# Patient Record
Sex: MALE | Race: WHITE | NOT HISPANIC OR LATINO | Employment: FULL TIME | URBAN - METROPOLITAN AREA
[De-identification: names, ages, dates, MRNs, and addresses within clinical notes are randomized per-mention and may not be internally consistent; named-entity substitution may affect disease eponyms.]

---

## 2018-02-09 ENCOUNTER — HOSPITAL ENCOUNTER (EMERGENCY)
Facility: OTHER | Age: 33
Discharge: HOME | End: 2018-02-09
Attending: EMERGENCY MEDICINE
Payer: COMMERCIAL

## 2018-02-09 VITALS
RESPIRATION RATE: 18 BRPM | TEMPERATURE: 98 F | OXYGEN SATURATION: 100 % | HEART RATE: 60 BPM | HEIGHT: 73 IN | WEIGHT: 185 LBS | DIASTOLIC BLOOD PRESSURE: 58 MMHG | BODY MASS INDEX: 24.52 KG/M2 | SYSTOLIC BLOOD PRESSURE: 102 MMHG

## 2018-02-09 DIAGNOSIS — R10.12 LEFT UPPER QUADRANT PAIN: ICD-10-CM

## 2018-02-09 DIAGNOSIS — J06.9 VIRAL URI WITH COUGH: Primary | ICD-10-CM

## 2018-02-09 LAB
ALBUMIN SERPL BCP-MCNC: 4.2 G/DL
ALP SERPL-CCNC: 54 U/L
ALT SERPL W/O P-5'-P-CCNC: 25 U/L
AMORPH CRY URNS QL MICRO: NORMAL
ANION GAP SERPL CALC-SCNC: 10 MMOL/L
AST SERPL-CCNC: 22 U/L
BACTERIA #/AREA URNS HPF: NORMAL /HPF
BASOPHILS # BLD AUTO: 0.01 K/UL
BASOPHILS NFR BLD: 0.1 %
BILIRUB SERPL-MCNC: 1.2 MG/DL
BILIRUB UR QL STRIP: NEGATIVE
BUN SERPL-MCNC: 16 MG/DL
CALCIUM SERPL-MCNC: 9.6 MG/DL
CHLORIDE SERPL-SCNC: 103 MMOL/L
CLARITY UR: CLEAR
CO2 SERPL-SCNC: 27 MMOL/L
COLOR UR: YELLOW
CREAT SERPL-MCNC: 1 MG/DL
DIFFERENTIAL METHOD: ABNORMAL
EOSINOPHIL # BLD AUTO: 0 K/UL
EOSINOPHIL NFR BLD: 0 %
ERYTHROCYTE [DISTWIDTH] IN BLOOD BY AUTOMATED COUNT: 12.6 %
EST. GFR  (AFRICAN AMERICAN): >60 ML/MIN/1.73 M^2
EST. GFR  (NON AFRICAN AMERICAN): >60 ML/MIN/1.73 M^2
GLUCOSE SERPL-MCNC: 113 MG/DL
GLUCOSE UR QL STRIP: NEGATIVE
HCT VFR BLD AUTO: 45.6 %
HETEROPH AB SERPL QL IA: NEGATIVE
HGB BLD-MCNC: 16.6 G/DL
HGB UR QL STRIP: NEGATIVE
KETONES UR QL STRIP: ABNORMAL
LEUKOCYTE ESTERASE UR QL STRIP: ABNORMAL
LIPASE SERPL-CCNC: 42 U/L
LYMPHOCYTES # BLD AUTO: 1.7 K/UL
LYMPHOCYTES NFR BLD: 12.5 %
MCH RBC QN AUTO: 31.2 PG
MCHC RBC AUTO-ENTMCNC: 36.4 G/DL
MCV RBC AUTO: 86 FL
MICROSCOPIC COMMENT: NORMAL
MONOCYTES # BLD AUTO: 1.1 K/UL
MONOCYTES NFR BLD: 8.3 %
NEUTROPHILS # BLD AUTO: 10.6 K/UL
NEUTROPHILS NFR BLD: 78.9 %
NITRITE UR QL STRIP: NEGATIVE
PH UR STRIP: 8 [PH] (ref 5–8)
PLATELET # BLD AUTO: 168 K/UL
PMV BLD AUTO: 10.5 FL
POTASSIUM SERPL-SCNC: 3.9 MMOL/L
PROT SERPL-MCNC: 7.3 G/DL
PROT UR QL STRIP: NEGATIVE
RBC # BLD AUTO: 5.32 M/UL
RBC #/AREA URNS HPF: 2 /HPF (ref 0–4)
SODIUM SERPL-SCNC: 140 MMOL/L
SP GR UR STRIP: 1.01 (ref 1–1.03)
SQUAMOUS #/AREA URNS HPF: 2 /HPF
URN SPEC COLLECT METH UR: ABNORMAL
UROBILINOGEN UR STRIP-ACNC: ABNORMAL EU/DL
WBC # BLD AUTO: 13.44 K/UL
WBC #/AREA URNS HPF: 4 /HPF (ref 0–5)
WBC CLUMPS URNS QL MICRO: NORMAL
YEAST URNS QL MICRO: NORMAL

## 2018-02-09 PROCEDURE — 25000003 PHARM REV CODE 250: Performed by: EMERGENCY MEDICINE

## 2018-02-09 PROCEDURE — 85025 COMPLETE CBC W/AUTO DIFF WBC: CPT

## 2018-02-09 PROCEDURE — 83690 ASSAY OF LIPASE: CPT

## 2018-02-09 PROCEDURE — 96361 HYDRATE IV INFUSION ADD-ON: CPT

## 2018-02-09 PROCEDURE — 80053 COMPREHEN METABOLIC PANEL: CPT

## 2018-02-09 PROCEDURE — 96374 THER/PROPH/DIAG INJ IV PUSH: CPT

## 2018-02-09 PROCEDURE — 36415 COLL VENOUS BLD VENIPUNCTURE: CPT

## 2018-02-09 PROCEDURE — 63600175 PHARM REV CODE 636 W HCPCS: Performed by: EMERGENCY MEDICINE

## 2018-02-09 PROCEDURE — 81000 URINALYSIS NONAUTO W/SCOPE: CPT

## 2018-02-09 PROCEDURE — 86308 HETEROPHILE ANTIBODY SCREEN: CPT

## 2018-02-09 PROCEDURE — S0028 INJECTION, FAMOTIDINE, 20 MG: HCPCS | Performed by: EMERGENCY MEDICINE

## 2018-02-09 PROCEDURE — 96375 TX/PRO/DX INJ NEW DRUG ADDON: CPT

## 2018-02-09 PROCEDURE — 99284 EMERGENCY DEPT VISIT MOD MDM: CPT | Mod: 25

## 2018-02-09 RX ORDER — FAMOTIDINE 20 MG/1
20 TABLET, FILM COATED ORAL 2 TIMES DAILY
Qty: 20 TABLET | Refills: 0 | Status: SHIPPED | OUTPATIENT
Start: 2018-02-09 | End: 2023-06-08

## 2018-02-09 RX ORDER — AZITHROMYCIN 250 MG/1
500 TABLET, FILM COATED ORAL DAILY
COMMUNITY
End: 2023-06-08

## 2018-02-09 RX ORDER — KETOROLAC TROMETHAMINE 30 MG/ML
15 INJECTION, SOLUTION INTRAMUSCULAR; INTRAVENOUS
Status: COMPLETED | OUTPATIENT
Start: 2018-02-09 | End: 2018-02-09

## 2018-02-09 RX ORDER — ONDANSETRON 2 MG/ML
4 INJECTION INTRAMUSCULAR; INTRAVENOUS
Status: COMPLETED | OUTPATIENT
Start: 2018-02-09 | End: 2018-02-09

## 2018-02-09 RX ORDER — ONDANSETRON 8 MG/1
8 TABLET, ORALLY DISINTEGRATING ORAL
COMMUNITY
End: 2023-06-08

## 2018-02-09 RX ORDER — FAMOTIDINE 10 MG/ML
20 INJECTION INTRAVENOUS
Status: COMPLETED | OUTPATIENT
Start: 2018-02-09 | End: 2018-02-09

## 2018-02-09 RX ORDER — OSELTAMIVIR PHOSPHATE 30 MG/1
30 CAPSULE ORAL 2 TIMES DAILY
COMMUNITY
End: 2023-06-08

## 2018-02-09 RX ORDER — HYDROMORPHONE HYDROCHLORIDE 1 MG/ML
0.5 INJECTION, SOLUTION INTRAMUSCULAR; INTRAVENOUS; SUBCUTANEOUS
Status: COMPLETED | OUTPATIENT
Start: 2018-02-09 | End: 2018-02-09

## 2018-02-09 RX ADMIN — SODIUM CHLORIDE 1000 ML: 0.9 INJECTION, SOLUTION INTRAVENOUS at 02:02

## 2018-02-09 RX ADMIN — LIDOCAINE HYDROCHLORIDE: 20 SOLUTION ORAL; TOPICAL at 04:02

## 2018-02-09 RX ADMIN — ONDANSETRON 4 MG: 2 INJECTION INTRAMUSCULAR; INTRAVENOUS at 02:02

## 2018-02-09 RX ADMIN — FAMOTIDINE 20 MG: 10 INJECTION, SOLUTION INTRAVENOUS at 04:02

## 2018-02-09 RX ADMIN — KETOROLAC TROMETHAMINE 15 MG: 30 INJECTION, SOLUTION INTRAMUSCULAR at 02:02

## 2018-02-09 RX ADMIN — HYDROMORPHONE HYDROCHLORIDE 0.5 MG: 1 INJECTION, SOLUTION INTRAMUSCULAR; INTRAVENOUS; SUBCUTANEOUS at 02:02

## 2018-02-09 NOTE — ED NOTES
Pt resting comfortably on stretcher with eyes open. Pt is AAOx4 and VSS with respirations even and unlabored at this time. Family present at this time. Side rails raised x2 with call light within reach. Pt denies any needs. Restroom and comfort needs addressed. Will continue to monitor for any changes.

## 2018-02-09 NOTE — ED NOTES
Rounding on the patient has been done. Pt is AAOx 4, no acute distress noted, respirations even, unlabored, vital signs stable with monitoring in process, skin warm and dry. The patient has been updated on the plan of care and current status. Pain was assessed and at pain level 8/10 . Comfort positioning and restroom needs were addressed. He denies any needs. He complains of intermitten abdominal pain. Necessary items were placed within reach and was advised when a reassessment would take place. The call bell remains at the bedside for any additional patient needs. The patient is resting comfortably on the stretcher, bed locked, low position, side rails up x 2.  Will continue to monitor.

## 2018-02-09 NOTE — ED NOTES
Pt presents to the ed with c/o lLUQ abdominal pain above his umbilicus with nausea. Pt reports he began taking tamiflu yesterday after being seen by Urgent Care. Pt states he has been feeling fatigued. Pt denies any emesis, sob or chest pain. Pt is currently on the floor, hunched over the bed, stating he feels better in this position.

## 2018-02-09 NOTE — ED PROVIDER NOTES
"Encounter Date: 2/9/2018    SCRIBE #1 NOTE: I, Daksha Thompson, am scribing for, and in the presence of, Dr. Costello.       History     Chief Complaint   Patient presents with    Abdominal Pain     " I just started Tamiflu yesterday now I have extreme abdominal cramps that won't go away". + nausea w/out vomiting  + chills/unsure of fever. Denies diarrhea     Time seen by provider: 1:45 PM    This is a 32 y.o. male who presents with complaint of abdominal pain that began yesterday. Patient describes abdominal pain as constant and cramping. Pain is located in the epigastrium. He denies any radiation of abdominal pain. He reports associated nausea. He denies any vomiting, diarrhea, constipation, dysuria, urinary frequency, or urinary urgency. Patient reports starting Tamiflu yesterday for a flu-like illness. Patient was not tested for the flu.  He was also prescribed azithromycin and treated with a steroid shot.  He had fever, chills, fatigue, cough, nausea, and back pain. He states these symptoms have improved.  He denies frequently taking NSAID'S but he did have a few doses of ibuprofen over the last few days. Patient denies any tobacco, alcohol, or drug use.       The history is provided by the patient.     Review of patient's allergies indicates:  No Known Allergies  History reviewed. No pertinent past medical history.  History reviewed. No pertinent surgical history.  No family history on file.  Social History   Substance Use Topics    Smoking status: Never Smoker    Smokeless tobacco: Not on file    Alcohol use Yes     Review of Systems   Constitutional: Positive for chills and fever.   HENT: Negative for congestion and sore throat.    Eyes: Negative for visual disturbance.   Respiratory: Positive for cough. Negative for shortness of breath.    Cardiovascular: Negative for chest pain and palpitations.   Gastrointestinal: Positive for abdominal pain and nausea. Negative for constipation, diarrhea and vomiting. "   Genitourinary: Negative for decreased urine volume, dysuria, frequency and urgency.   Musculoskeletal: Negative for joint swelling, neck pain and neck stiffness.   Skin: Negative for rash and wound.   Neurological: Negative for weakness, numbness and headaches.   Psychiatric/Behavioral: Negative for behavioral problems and confusion.       Physical Exam     Initial Vitals [02/09/18 1326]   BP Pulse Resp Temp SpO2   136/64 71 18 97.4 °F (36.3 °C) 100 %      MAP       88         Physical Exam    Nursing note and vitals reviewed.  Constitutional: He appears well-developed and well-nourished. He is not diaphoretic. He appears distressed.   HENT:   Head: Normocephalic and atraumatic.   Right Ear: External ear normal.   Left Ear: External ear normal.   Mouth/Throat: Oropharynx is clear and moist.   Eyes: EOM are normal. Right eye exhibits no discharge. Left eye exhibits no discharge.   Neck: Normal range of motion.   Cardiovascular: Normal rate, regular rhythm and normal heart sounds. Exam reveals no gallop and no friction rub.    No murmur heard.  Pulmonary/Chest: Breath sounds normal. No respiratory distress. He has no wheezes. He has no rhonchi. He has no rales.   Abdominal: Soft. There is tenderness in the epigastric area. There is no rebound and no guarding.   Musculoskeletal: Normal range of motion. He exhibits no edema or tenderness.   Neurological: He is alert and oriented to person, place, and time. He has normal strength.   Skin: Skin is warm and dry. No rash and no abscess noted. No erythema. No pallor.   Psychiatric: He has a normal mood and affect. His behavior is normal. Judgment and thought content normal.         ED Course   Procedures  Labs Reviewed   COMPREHENSIVE METABOLIC PANEL - Abnormal; Notable for the following:        Result Value    Glucose 113 (*)     Total Bilirubin 1.2 (*)     Alkaline Phosphatase 54 (*)     All other components within normal limits   LIPASE   CBC W/ AUTO DIFFERENTIAL    URINALYSIS        Imaging Results          CT Renal Stone Study ABD Pelvis WO (Final result)  Result time 02/09/18 14:25:27    Final result by Tashia Chang MD (02/09/18 14:25:27)                 Impression:        No evidence of urolithiasis or obstructive uropathy.      Electronically signed by: TASHIA CHANG MD  Date:     02/09/18  Time:    14:25              Narrative:    Renal Stone Study    Time of Procedure: 02/09/18 14:15:00  Accession # [id number]    CLINICAL INDICATION: 32 year old M with Abdominal pain, unspecified       TECHNIQUE: Axial CT images obtained throughout the region of the abdomen and pelvis without the use of oral or intravenous contrast via renal stone study protocol. Axial, sagittal and coronal reconstructions were performed.    COMPARISON:  No priors.    FINDINGS:    Kidneys are normal in size and contour without focal lesion. No renal calculi are identified. The ureters are decompressed. No ureteral calculi are apparent. Urinary bladder appears unremarkable.    The remainder of the visualized structures demonstrate no significant abnormalities on this noncontrast study.                                 Medical Decision Making:   Clinical Tests:   Lab Tests: Ordered and Reviewed  Radiological Study: Ordered and Reviewed  ED Management:  Emergent evaluation of 32-year-old male with complaint of abdominal pain, after starting Tamiflu.  Vital signs are benign, afebrile.  On exam he was very tender in the epigastrium with guarding, also left upper quadrant.  Labs reveal mild leukocytosis but otherwise normal with no pancreatitis or hepatitis.  I did notice slight monocytosis on differential and Monospot test was sent but negative.  I was concerned for possible renal colic given the amount of distress he was then versus perforated ulcer.  No acute findings on CT scan.  Symptoms improved after Pepcid GI cocktail Toradol and a fluid bolus.  He was discharged in good condition with prescription  for Pepcid, encouraged to discontinue Tamiflu and to follow-up with his PCP or return for new or worsening symptoms.              Attending Attestation:           Physician Attestation for Scribe:  Physician Attestation Statement for Scribe #1: I, Dr. Costello, reviewed documentation, as scribed by Daksha Thompson in my presence, and it is both accurate and complete.                 ED Course      Clinical Impression:   No diagnosis found.                             Shaye Costello MD  02/09/18 1824       Shaye Costello MD  02/09/18 1826

## 2019-02-13 ENCOUNTER — OFFICE VISIT (OUTPATIENT)
Dept: URGENT CARE | Facility: CLINIC | Age: 34
End: 2019-02-13
Payer: COMMERCIAL

## 2019-02-13 VITALS
WEIGHT: 185 LBS | OXYGEN SATURATION: 99 % | TEMPERATURE: 98 F | HEIGHT: 73 IN | DIASTOLIC BLOOD PRESSURE: 80 MMHG | RESPIRATION RATE: 18 BRPM | HEART RATE: 80 BPM | BODY MASS INDEX: 24.52 KG/M2 | SYSTOLIC BLOOD PRESSURE: 120 MMHG

## 2019-02-13 DIAGNOSIS — J32.9 SINUSITIS, UNSPECIFIED CHRONICITY, UNSPECIFIED LOCATION: Primary | ICD-10-CM

## 2019-02-13 PROCEDURE — 99203 OFFICE O/P NEW LOW 30 MIN: CPT | Mod: S$GLB,,, | Performed by: FAMILY MEDICINE

## 2019-02-13 PROCEDURE — 3008F PR BODY MASS INDEX (BMI) DOCUMENTED: ICD-10-PCS | Mod: CPTII,S$GLB,, | Performed by: FAMILY MEDICINE

## 2019-02-13 PROCEDURE — 99203 PR OFFICE/OUTPT VISIT, NEW, LEVL III, 30-44 MIN: ICD-10-PCS | Mod: S$GLB,,, | Performed by: FAMILY MEDICINE

## 2019-02-13 PROCEDURE — 3008F BODY MASS INDEX DOCD: CPT | Mod: CPTII,S$GLB,, | Performed by: FAMILY MEDICINE

## 2019-02-13 RX ORDER — VALACYCLOVIR HYDROCHLORIDE 1 G/1
TABLET, FILM COATED ORAL
COMMUNITY
Start: 2018-12-20 | End: 2023-11-16 | Stop reason: SDUPTHER

## 2019-02-13 RX ORDER — DEXTROAMPHETAMINE SACCHARATE, AMPHETAMINE ASPARTATE, DEXTROAMPHETAMINE SULFATE AND AMPHETAMINE SULFATE 3.75; 3.75; 3.75; 3.75 MG/1; MG/1; MG/1; MG/1
TABLET ORAL
COMMUNITY
Start: 2018-11-06 | End: 2023-06-08

## 2019-02-13 NOTE — PROGRESS NOTES
"Subjective:       Patient ID: Moiz Galarza is a 33 y.o. male.    Vitals:  height is 6' 1" (1.854 m) and weight is 83.9 kg (185 lb). His tympanic temperature is 98.4 °F (36.9 °C). His blood pressure is 120/80 and his pulse is 80. His respiration is 18 and oxygen saturation is 99%.     Chief Complaint: Sinus Problem    Patient states hes been having some sinus congestion for 6 days. Taking dayquil. No fever.       Sinus Problem   This is a new problem. The current episode started in the past 7 days. The problem has been gradually worsening since onset. There has been no fever. His pain is at a severity of 5/10. The pain is moderate. Associated symptoms include headaches, sinus pressure, sneezing and a sore throat. Pertinent negatives include no chills, congestion, coughing, diaphoresis, ear pain, hoarse voice, neck pain, shortness of breath or swollen glands. Treatments tried: dayquil and aleeve. The treatment provided no relief.       Constitution: Negative for appetite change, chills, sweating, fatigue and fever.   HENT: Positive for postnasal drip, sinus pain, sinus pressure and sore throat. Negative for ear pain, congestion and voice change.    Neck: Negative for neck pain and painful lymph nodes.   Eyes: Negative for eye redness.   Respiratory: Negative for chest tightness, cough, sputum production, bloody sputum, COPD, shortness of breath, stridor, wheezing and asthma.    Gastrointestinal: Negative for nausea and vomiting.   Musculoskeletal: Negative for muscle ache.   Skin: Negative for rash.   Allergic/Immunologic: Positive for sneezing. Negative for seasonal allergies and asthma.   Neurological: Positive for headaches.   Hematologic/Lymphatic: Negative for swollen lymph nodes.       Objective:      Physical Exam   Constitutional: He is oriented to person, place, and time. He appears well-developed and well-nourished. He is cooperative.  Non-toxic appearance. He does not appear ill. No distress.   HENT: "   Head: Normocephalic and atraumatic.   Right Ear: Hearing, tympanic membrane, external ear and ear canal normal.   Left Ear: Hearing, tympanic membrane, external ear and ear canal normal.   Nose: Mucosal edema present. No rhinorrhea or nasal deformity. No epistaxis. Right sinus exhibits no maxillary sinus tenderness and no frontal sinus tenderness. Left sinus exhibits no maxillary sinus tenderness and no frontal sinus tenderness.   Mouth/Throat: Uvula is midline and mucous membranes are normal. No trismus in the jaw. Normal dentition. No uvula swelling. Posterior oropharyngeal erythema (mild) present. No oropharyngeal exudate.   Eyes: Conjunctivae and lids are normal. No scleral icterus.   Sclera clear bilat   Neck: Trachea normal, full passive range of motion without pain and phonation normal. Neck supple.   Cardiovascular: Normal rate, regular rhythm, normal heart sounds, intact distal pulses and normal pulses.   Pulmonary/Chest: Effort normal and breath sounds normal. No respiratory distress. He has no decreased breath sounds. He has no wheezes. He has no rhonchi.   Abdominal: Soft. Normal appearance and bowel sounds are normal. He exhibits no distension. There is no tenderness.   Musculoskeletal: Normal range of motion. He exhibits no edema or deformity.   Neurological: He is alert and oriented to person, place, and time. He exhibits normal muscle tone. Coordination normal.   Skin: Skin is warm, dry and intact. He is not diaphoretic. No pallor.   Psychiatric: He has a normal mood and affect. His speech is normal and behavior is normal. Judgment and thought content normal. Cognition and memory are normal.   Nursing note and vitals reviewed.      Assessment:       1. Sinusitis, unspecified chronicity, unspecified location        Plan:         Sinusitis, unspecified chronicity, unspecified location       asked pt to f/u if sinus congestion for 10 days, early for abx    Patient Instructions   PLEASE READ YOUR  DISCHARGE INSTRUCTIONS ENTIRELY AS IT CONTAINS IMPORTANT INFORMATION.      Please drink plenty of fluids.    Please get plenty of rest.    Please return here or go to the Emergency Department for any concerns or worsening of condition.    Continue dayquil     Try an over the counter decongestant like Mucinex D or Sudafed.     If you do have Hypertension or palpitations, it is safe to take Coricidin HBP for relief of sinus symptoms.    If not allergic, please take over the counter Tylenol (Acetaminophen) and/or Motrin (Ibuprofen) as directed for control of pain and/or fever.  Please follow up with your primary care doctor or specialist as needed.    Sore throat recommendations: Warm fluids, warm salt water gargles, throat lozenges, tea, honey, soup, rest, hydration.    Use over the counter flonase: one spray each nostril twice daily OR two sprays each nostril once daily.     If you  smoke, please stop smoking.      Please return or see your primary care doctor if you develop new or worsening symptoms.     Please arrange follow up with your primary medical clinic as soon as possible. You must understand that you've received an Urgent Care treatment only and that you may be released before all of your medical problems are known or treated. You, the patient, will arrange for follow up as instructed. If your symptoms worsen or fail to improve you should go to the Emergency Room.    Sinusitis (No Antibiotics)    The sinuses are air-filled spaces within the bones of the face. They connect to the inside of the nose. Sinusitis is an inflammation of the tissue lining the sinus cavity. Sinus inflammation can occur during a cold. It can also be due to allergies to pollens and other particles in the air. It can cause symptoms such as sinus congestion, headache, sore throat, facial swelling and fullness. It may also cause a low-grade fever. No infection is present, and no antibiotic treatment is needed.  Home care  · Drink plenty  of water, hot tea, and other liquids. This may help thin mucus. It also may promote sinus drainage.  · Heat may help soothe painful areas of the face. Use a towel soaked in hot water. Or,  the shower and direct the hot spray onto your face. Using a vaporizer along with a menthol rub at night may also help.   · An expectorant containing guaifenesin may help thin the mucus and promote drainage from the sinuses.  · Over-the-counter decongestants may be used unless a similar medicine was prescribed. Nasal sprays work the fastest. Use one that contains phenylephrine or oxymetazoline. First blow the nose gently. Then use the spray. Do not use these medicines more often than directed on the label or symptoms may get worse. You may also use tablets containing pseudoephedrine. Avoid products that combine ingredients, because side effects may be increased. Read labels. You can also ask the pharmacist for help. (NOTE: Persons with high blood pressure should not use decongestants. They can raise blood pressure.)  · Over-the-counter antihistamines may help if allergies contributed to your sinusitis.    · Use acetaminophen or ibuprofen to control pain, unless another pain medicine was prescribed. (If you have chronic liver or kidney disease or ever had a stomach ulcer, talk with your doctor before using these medicines. Aspirin should never be used in anyone under 18 years of age who is ill with a fever. It may cause severe liver damage.)  · Use nasal rinses or irrigation as instructed by your health care provider.  · Don't smoke. This can worsen symptoms.  Follow-up care  Follow up with your healthcare provider or our staff if you are not improving within the next week.  When to seek medical advice  Call your healthcare provider if any of these occur:  · Green or yellow discharge from the nose or into the throat  · Facial pain or headache becoming more severe  · Stiff neck  · Unusual drowsiness or confusion  · Swelling  of the forehead or eyelids  · Vision problems, including blurred or double vision  · Fever of 100.4ºF (38ºC) or higher, or as directed by your healthcare provider  · Seizure  · Breathing problems  · Symptoms not resolving within 10 days  Date Last Reviewed: 4/13/2015  © 7765-6495 [employer]. [address] [place] [address], [place] [address]. All rights reserved. This information is not intended as a substitute for professional medical care. Always follow your healthcare professional's instructions.

## 2019-02-14 NOTE — PATIENT INSTRUCTIONS
PLEASE READ YOUR DISCHARGE INSTRUCTIONS ENTIRELY AS IT CONTAINS IMPORTANT INFORMATION.      Please drink plenty of fluids.    Please get plenty of rest.    Please return here or go to the Emergency Department for any concerns or worsening of condition.    Continue dayquil     Try an over the counter decongestant like Mucinex D or Sudafed.     If you do have Hypertension or palpitations, it is safe to take Coricidin HBP for relief of sinus symptoms.    If not allergic, please take over the counter Tylenol (Acetaminophen) and/or Motrin (Ibuprofen) as directed for control of pain and/or fever.  Please follow up with your primary care doctor or specialist as needed.    Sore throat recommendations: Warm fluids, warm salt water gargles, throat lozenges, tea, honey, soup, rest, hydration.    Use over the counter flonase: one spray each nostril twice daily OR two sprays each nostril once daily.     If you  smoke, please stop smoking.      Please return or see your primary care doctor if you develop new or worsening symptoms.     Please arrange follow up with your primary medical clinic as soon as possible. You must understand that you've received an Urgent Care treatment only and that you may be released before all of your medical problems are known or treated. You, the patient, will arrange for follow up as instructed. If your symptoms worsen or fail to improve you should go to the Emergency Room.    Sinusitis (No Antibiotics)    The sinuses are air-filled spaces within the bones of the face. They connect to the inside of the nose. Sinusitis is an inflammation of the tissue lining the sinus cavity. Sinus inflammation can occur during a cold. It can also be due to allergies to pollens and other particles in the air. It can cause symptoms such as sinus congestion, headache, sore throat, facial swelling and fullness. It may also cause a low-grade fever. No infection is present, and no antibiotic treatment is needed.  Home  care  · Drink plenty of water, hot tea, and other liquids. This may help thin mucus. It also may promote sinus drainage.  · Heat may help soothe painful areas of the face. Use a towel soaked in hot water. Or,  the shower and direct the hot spray onto your face. Using a vaporizer along with a menthol rub at night may also help.   · An expectorant containing guaifenesin may help thin the mucus and promote drainage from the sinuses.  · Over-the-counter decongestants may be used unless a similar medicine was prescribed. Nasal sprays work the fastest. Use one that contains phenylephrine or oxymetazoline. First blow the nose gently. Then use the spray. Do not use these medicines more often than directed on the label or symptoms may get worse. You may also use tablets containing pseudoephedrine. Avoid products that combine ingredients, because side effects may be increased. Read labels. You can also ask the pharmacist for help. (NOTE: Persons with high blood pressure should not use decongestants. They can raise blood pressure.)  · Over-the-counter antihistamines may help if allergies contributed to your sinusitis.    · Use acetaminophen or ibuprofen to control pain, unless another pain medicine was prescribed. (If you have chronic liver or kidney disease or ever had a stomach ulcer, talk with your doctor before using these medicines. Aspirin should never be used in anyone under 18 years of age who is ill with a fever. It may cause severe liver damage.)  · Use nasal rinses or irrigation as instructed by your health care provider.  · Don't smoke. This can worsen symptoms.  Follow-up care  Follow up with your healthcare provider or our staff if you are not improving within the next week.  When to seek medical advice  Call your healthcare provider if any of these occur:  · Green or yellow discharge from the nose or into the throat  · Facial pain or headache becoming more severe  · Stiff neck  · Unusual drowsiness or  confusion  · Swelling of the forehead or eyelids  · Vision problems, including blurred or double vision  · Fever of 100.4ºF (38ºC) or higher, or as directed by your healthcare provider  · Seizure  · Breathing problems  · Symptoms not resolving within 10 days  Date Last Reviewed: 4/13/2015  © 9104-8951 [employer]. [address] [place] [address], [place] [address]. All rights reserved. This information is not intended as a substitute for professional medical care. Always follow your healthcare professional's instructions.

## 2019-02-19 ENCOUNTER — TELEPHONE (OUTPATIENT)
Dept: URGENT CARE | Facility: CLINIC | Age: 34
End: 2019-02-19

## 2023-06-08 ENCOUNTER — LAB VISIT (OUTPATIENT)
Dept: LAB | Facility: HOSPITAL | Age: 38
End: 2023-06-08
Attending: STUDENT IN AN ORGANIZED HEALTH CARE EDUCATION/TRAINING PROGRAM
Payer: COMMERCIAL

## 2023-06-08 ENCOUNTER — OFFICE VISIT (OUTPATIENT)
Dept: INTERNAL MEDICINE | Facility: CLINIC | Age: 38
End: 2023-06-08
Payer: COMMERCIAL

## 2023-06-08 VITALS
BODY MASS INDEX: 25.48 KG/M2 | OXYGEN SATURATION: 99 % | SYSTOLIC BLOOD PRESSURE: 105 MMHG | HEART RATE: 63 BPM | TEMPERATURE: 98 F | WEIGHT: 192.25 LBS | DIASTOLIC BLOOD PRESSURE: 70 MMHG | HEIGHT: 73 IN

## 2023-06-08 DIAGNOSIS — Z00.00 LABORATORY EXAMINATION ORDERED AS PART OF A ROUTINE GENERAL MEDICAL EXAMINATION: ICD-10-CM

## 2023-06-08 DIAGNOSIS — Z76.89 ESTABLISHING CARE WITH NEW DOCTOR, ENCOUNTER FOR: Primary | ICD-10-CM

## 2023-06-08 DIAGNOSIS — B00.1 FEVER BLISTER: ICD-10-CM

## 2023-06-08 DIAGNOSIS — F90.1 ATTENTION DEFICIT HYPERACTIVITY DISORDER (ADHD), PREDOMINANTLY HYPERACTIVE TYPE: ICD-10-CM

## 2023-06-08 LAB
ALBUMIN SERPL BCP-MCNC: 4.4 G/DL (ref 3.5–5.2)
ALP SERPL-CCNC: 49 U/L (ref 55–135)
ALT SERPL W/O P-5'-P-CCNC: 27 U/L (ref 10–44)
ANION GAP SERPL CALC-SCNC: 10 MMOL/L (ref 8–16)
AST SERPL-CCNC: 29 U/L (ref 10–40)
BASOPHILS # BLD AUTO: 0.08 K/UL (ref 0–0.2)
BASOPHILS NFR BLD: 1.3 % (ref 0–1.9)
BILIRUB SERPL-MCNC: 1.4 MG/DL (ref 0.1–1)
BUN SERPL-MCNC: 15 MG/DL (ref 6–20)
CALCIUM SERPL-MCNC: 9.8 MG/DL (ref 8.7–10.5)
CHLORIDE SERPL-SCNC: 106 MMOL/L (ref 95–110)
CHOLEST SERPL-MCNC: 179 MG/DL (ref 120–199)
CHOLEST/HDLC SERPL: 2.9 {RATIO} (ref 2–5)
CO2 SERPL-SCNC: 26 MMOL/L (ref 23–29)
CREAT SERPL-MCNC: 1.1 MG/DL (ref 0.5–1.4)
DIFFERENTIAL METHOD: NORMAL
EOSINOPHIL # BLD AUTO: 0.3 K/UL (ref 0–0.5)
EOSINOPHIL NFR BLD: 5 % (ref 0–8)
ERYTHROCYTE [DISTWIDTH] IN BLOOD BY AUTOMATED COUNT: 12.7 % (ref 11.5–14.5)
EST. GFR  (NO RACE VARIABLE): >60 ML/MIN/1.73 M^2
GLUCOSE SERPL-MCNC: 85 MG/DL (ref 70–110)
HCT VFR BLD AUTO: 46 % (ref 40–54)
HCV AB SERPL QL IA: NORMAL
HDLC SERPL-MCNC: 61 MG/DL (ref 40–75)
HDLC SERPL: 34.1 % (ref 20–50)
HGB BLD-MCNC: 15.7 G/DL (ref 14–18)
HIV 1+2 AB+HIV1 P24 AG SERPL QL IA: NORMAL
IMM GRANULOCYTES # BLD AUTO: 0.01 K/UL (ref 0–0.04)
IMM GRANULOCYTES NFR BLD AUTO: 0.2 % (ref 0–0.5)
LDLC SERPL CALC-MCNC: 109.8 MG/DL (ref 63–159)
LYMPHOCYTES # BLD AUTO: 2.1 K/UL (ref 1–4.8)
LYMPHOCYTES NFR BLD: 35.1 % (ref 18–48)
MCH RBC QN AUTO: 30.4 PG (ref 27–31)
MCHC RBC AUTO-ENTMCNC: 34.1 G/DL (ref 32–36)
MCV RBC AUTO: 89 FL (ref 82–98)
MONOCYTES # BLD AUTO: 0.5 K/UL (ref 0.3–1)
MONOCYTES NFR BLD: 7.9 % (ref 4–15)
NEUTROPHILS # BLD AUTO: 3 K/UL (ref 1.8–7.7)
NEUTROPHILS NFR BLD: 50.5 % (ref 38–73)
NONHDLC SERPL-MCNC: 118 MG/DL
NRBC BLD-RTO: 0 /100 WBC
PLATELET # BLD AUTO: 178 K/UL (ref 150–450)
PMV BLD AUTO: 10.1 FL (ref 9.2–12.9)
POTASSIUM SERPL-SCNC: 4.3 MMOL/L (ref 3.5–5.1)
PROT SERPL-MCNC: 7.2 G/DL (ref 6–8.4)
RBC # BLD AUTO: 5.17 M/UL (ref 4.6–6.2)
SODIUM SERPL-SCNC: 142 MMOL/L (ref 136–145)
TRIGL SERPL-MCNC: 41 MG/DL (ref 30–150)
TSH SERPL DL<=0.005 MIU/L-ACNC: 1.07 UIU/ML (ref 0.4–4)
WBC # BLD AUTO: 5.98 K/UL (ref 3.9–12.7)

## 2023-06-08 PROCEDURE — 99999 PR PBB SHADOW E&M-NEW PATIENT-LVL IV: CPT | Mod: PBBFAC,,, | Performed by: STUDENT IN AN ORGANIZED HEALTH CARE EDUCATION/TRAINING PROGRAM

## 2023-06-08 PROCEDURE — 1159F PR MEDICATION LIST DOCUMENTED IN MEDICAL RECORD: ICD-10-PCS | Mod: CPTII,S$GLB,, | Performed by: STUDENT IN AN ORGANIZED HEALTH CARE EDUCATION/TRAINING PROGRAM

## 2023-06-08 PROCEDURE — 3078F DIAST BP <80 MM HG: CPT | Mod: CPTII,S$GLB,, | Performed by: STUDENT IN AN ORGANIZED HEALTH CARE EDUCATION/TRAINING PROGRAM

## 2023-06-08 PROCEDURE — 36415 COLL VENOUS BLD VENIPUNCTURE: CPT | Performed by: STUDENT IN AN ORGANIZED HEALTH CARE EDUCATION/TRAINING PROGRAM

## 2023-06-08 PROCEDURE — 3074F SYST BP LT 130 MM HG: CPT | Mod: CPTII,S$GLB,, | Performed by: STUDENT IN AN ORGANIZED HEALTH CARE EDUCATION/TRAINING PROGRAM

## 2023-06-08 PROCEDURE — 3008F PR BODY MASS INDEX (BMI) DOCUMENTED: ICD-10-PCS | Mod: CPTII,S$GLB,, | Performed by: STUDENT IN AN ORGANIZED HEALTH CARE EDUCATION/TRAINING PROGRAM

## 2023-06-08 PROCEDURE — 80061 LIPID PANEL: CPT | Performed by: STUDENT IN AN ORGANIZED HEALTH CARE EDUCATION/TRAINING PROGRAM

## 2023-06-08 PROCEDURE — 87389 HIV-1 AG W/HIV-1&-2 AB AG IA: CPT | Performed by: STUDENT IN AN ORGANIZED HEALTH CARE EDUCATION/TRAINING PROGRAM

## 2023-06-08 PROCEDURE — 99204 PR OFFICE/OUTPT VISIT, NEW, LEVL IV, 45-59 MIN: ICD-10-PCS | Mod: S$GLB,,, | Performed by: STUDENT IN AN ORGANIZED HEALTH CARE EDUCATION/TRAINING PROGRAM

## 2023-06-08 PROCEDURE — 80053 COMPREHEN METABOLIC PANEL: CPT | Performed by: STUDENT IN AN ORGANIZED HEALTH CARE EDUCATION/TRAINING PROGRAM

## 2023-06-08 PROCEDURE — 3078F PR MOST RECENT DIASTOLIC BLOOD PRESSURE < 80 MM HG: ICD-10-PCS | Mod: CPTII,S$GLB,, | Performed by: STUDENT IN AN ORGANIZED HEALTH CARE EDUCATION/TRAINING PROGRAM

## 2023-06-08 PROCEDURE — 84443 ASSAY THYROID STIM HORMONE: CPT | Performed by: STUDENT IN AN ORGANIZED HEALTH CARE EDUCATION/TRAINING PROGRAM

## 2023-06-08 PROCEDURE — 1159F MED LIST DOCD IN RCRD: CPT | Mod: CPTII,S$GLB,, | Performed by: STUDENT IN AN ORGANIZED HEALTH CARE EDUCATION/TRAINING PROGRAM

## 2023-06-08 PROCEDURE — 3008F BODY MASS INDEX DOCD: CPT | Mod: CPTII,S$GLB,, | Performed by: STUDENT IN AN ORGANIZED HEALTH CARE EDUCATION/TRAINING PROGRAM

## 2023-06-08 PROCEDURE — 1160F RVW MEDS BY RX/DR IN RCRD: CPT | Mod: CPTII,S$GLB,, | Performed by: STUDENT IN AN ORGANIZED HEALTH CARE EDUCATION/TRAINING PROGRAM

## 2023-06-08 PROCEDURE — 83036 HEMOGLOBIN GLYCOSYLATED A1C: CPT | Performed by: STUDENT IN AN ORGANIZED HEALTH CARE EDUCATION/TRAINING PROGRAM

## 2023-06-08 PROCEDURE — 3074F PR MOST RECENT SYSTOLIC BLOOD PRESSURE < 130 MM HG: ICD-10-PCS | Mod: CPTII,S$GLB,, | Performed by: STUDENT IN AN ORGANIZED HEALTH CARE EDUCATION/TRAINING PROGRAM

## 2023-06-08 PROCEDURE — 85025 COMPLETE CBC W/AUTO DIFF WBC: CPT | Performed by: STUDENT IN AN ORGANIZED HEALTH CARE EDUCATION/TRAINING PROGRAM

## 2023-06-08 PROCEDURE — 99204 OFFICE O/P NEW MOD 45 MIN: CPT | Mod: S$GLB,,, | Performed by: STUDENT IN AN ORGANIZED HEALTH CARE EDUCATION/TRAINING PROGRAM

## 2023-06-08 PROCEDURE — 1160F PR REVIEW ALL MEDS BY PRESCRIBER/CLIN PHARMACIST DOCUMENTED: ICD-10-PCS | Mod: CPTII,S$GLB,, | Performed by: STUDENT IN AN ORGANIZED HEALTH CARE EDUCATION/TRAINING PROGRAM

## 2023-06-08 PROCEDURE — 86803 HEPATITIS C AB TEST: CPT | Performed by: STUDENT IN AN ORGANIZED HEALTH CARE EDUCATION/TRAINING PROGRAM

## 2023-06-08 PROCEDURE — 99999 PR PBB SHADOW E&M-NEW PATIENT-LVL IV: ICD-10-PCS | Mod: PBBFAC,,, | Performed by: STUDENT IN AN ORGANIZED HEALTH CARE EDUCATION/TRAINING PROGRAM

## 2023-06-08 RX ORDER — LISDEXAMFETAMINE DIMESYLATE CAPSULES 20 MG/1
20 CAPSULE ORAL EVERY MORNING
Qty: 30 CAPSULE | Refills: 0 | Status: SHIPPED | OUTPATIENT
Start: 2023-06-08 | End: 2023-07-10 | Stop reason: SDUPTHER

## 2023-06-08 NOTE — PROGRESS NOTES
SUBJECTIVE     Chief Complaint   Patient presents with    Establish Care       HPI  Moiz Galarza is a 38 y.o. male with  ADHD, intermittent fever blisters  that presents for establishment of care.     Recently moved back to [place] from [place].     Medication: Vyvanse 20 mg qd. Diagnosed 15 years ago.   Symptoms Controlled: Yes  Medication Holidays: Occasionally.   Adverse Effects: No palpitations, headaches, decreased appetite, heightened anxiety, disrupted sleep.    reviewed: No abnormal filling patterns.     Valtrex PRN for fever blisters.    Tobacco: Never  EtOH: Social  Recreational Drugs: None  Works in finance.  with wife, two sons ages 4 & 6.   Physical activity: Works out 2-3 times a week.   Diet: Regular.      Patient's past medical and surgical history, social history, family history, allergies, and medications reviewed in detail by this physician.     PAST MEDICAL HISTORY:  Past Medical History:   Diagnosis Date    ADHD (attention deficit hyperactivity disorder)      PAST SURGICAL HISTORY:  Past Surgical History:   Procedure Laterality Date    VASECTOMY         FAMILY HISTORY:  Family History   Problem Relation Age of Onset    No Known Problems Mother     No Known Problems Father     Breast cancer Maternal Aunt     Heart attack Maternal Grandfather        ALLERGIES AND MEDICATIONS: updated and reviewed.  Review of patient's allergies indicates:  No Known Allergies  Current Outpatient Medications   Medication Sig Dispense Refill    valACYclovir (VALTREX) 1000 MG tablet       lisdexamfetamine (VYVANSE) 20 MG capsule Take 1 capsule (20 mg total) by mouth every morning. 30 capsule 0     No current facility-administered medications for this visit.       ROS  Review of Systems   Constitutional:  Negative for activity change, chills and fever.   HENT:  Negative for congestion and hearing loss.    Eyes:  Negative for pain and visual disturbance.   Respiratory:  Negative for cough and  shortness of breath.    Cardiovascular:  Negative for chest pain and palpitations.   Gastrointestinal:  Negative for abdominal pain, constipation, diarrhea, nausea and vomiting.   Endocrine: Negative.    Genitourinary: Negative.    Musculoskeletal:  Negative for arthralgias and myalgias.   Skin: Negative.    Allergic/Immunologic: Negative.    Neurological:  Negative for dizziness, light-headedness and headaches.   Hematological: Negative.        OBJECTIVE     Physical Exam  Vitals:    06/08/23 0756   BP: 105/70   Pulse: 63   Temp: 97.7 °F (36.5 °C)    Body mass index is 25.36 kg/m².            Physical Exam  Vitals reviewed.   Constitutional:       General: He is not in acute distress.     Appearance: Normal appearance.   HENT:      Head: Normocephalic and atraumatic.      Mouth/Throat:      Mouth: Mucous membranes are moist.      Pharynx: Oropharynx is clear.   Eyes:      Extraocular Movements: Extraocular movements intact.      Conjunctiva/sclera: Conjunctivae normal.      Pupils: Pupils are equal, round, and reactive to light.   Cardiovascular:      Rate and Rhythm: Normal rate and regular rhythm.      Pulses: Normal pulses.      Heart sounds: Normal heart sounds.   Pulmonary:      Effort: Pulmonary effort is normal.      Breath sounds: Normal breath sounds.   Abdominal:      General: Bowel sounds are normal. There is no distension.      Palpations: Abdomen is soft. There is no mass.      Tenderness: There is no abdominal tenderness. There is no guarding.   Musculoskeletal:         General: Normal range of motion.      Cervical back: Normal range of motion and neck supple. No rigidity or tenderness.      Right lower leg: No edema.      Left lower leg: No edema.   Lymphadenopathy:      Cervical: No cervical adenopathy.   Skin:     General: Skin is warm and dry.   Neurological:      General: No focal deficit present.      Mental Status: He is alert.   Psychiatric:         Mood and Affect: Mood normal.          Behavior: Behavior normal.         Health Maintenance         Date Due Completion Date    Hepatitis C Screening Never done ---    COVID-19 Vaccine (1) Never done ---    HIV Screening Never done ---    TETANUS VACCINE Never done ---    Influenza Vaccine (Season Ended) 09/01/2023 11/30/2018    Lipid Panel 12/07/2023 12/7/2018              ASSESSMENT     38 y.o. male with     1. Establishing care with new doctor, encounter for    2. Laboratory examination ordered as part of a routine general medical examination    3. Attention deficit hyperactivity disorder (ADHD), predominantly hyperactive type    4. Recurrent fever blisters        PLAN:     1. Establishing care with new doctor, encounter for  - Reviewed patient's medical, surgical, family, and social history; chart updated.   - Prior notes/labs/imaging reviewed.    2. Laboratory examination ordered as part of a routine general medical examination  - Hemoglobin A1C; Future  - HIV 1/2 Ag/Ab (4th Gen); Future  - Hepatitis C Antibody; Future  - TSH; Future  - Lipid Panel; Future  - Comprehensive Metabolic Panel; Future  - CBC Auto Differential; Future    3. Attention deficit hyperactivity disorder (ADHD), predominantly hyperactive type  - Controlled on current regimen. Continue.   -  reviewed.   - Virtual in 3 months for medication mgmt.   - lisdexamfetamine (VYVANSE) 20 MG capsule; Take 1 capsule (20 mg total) by mouth every morning.  Dispense: 30 capsule; Refill: 0    3. Recurrent Fever Blisters  - No recent outbreaks. Controlled with PRN Valtrex. Continue.     RTC in 3 months via virtual visit.     Naeem Brown MD  Family Medicine  [facility]  06/08/2023    This document was created using voice recognition software (M*Modal Fluency Direct). Although it may be edited, this document may contain errors related to incorrect recognition of the spoken word. Please call the physician if clarification is needed.           No follow-ups  on file.

## 2023-06-09 ENCOUNTER — PATIENT MESSAGE (OUTPATIENT)
Dept: INTERNAL MEDICINE | Facility: CLINIC | Age: 38
End: 2023-06-09
Payer: COMMERCIAL

## 2023-06-09 LAB
ESTIMATED AVG GLUCOSE: 94 MG/DL (ref 68–131)
HBA1C MFR BLD: 4.9 % (ref 4–5.6)

## 2023-07-10 ENCOUNTER — PATIENT MESSAGE (OUTPATIENT)
Dept: INTERNAL MEDICINE | Facility: CLINIC | Age: 38
End: 2023-07-10
Payer: COMMERCIAL

## 2023-07-10 DIAGNOSIS — F90.1 ATTENTION DEFICIT HYPERACTIVITY DISORDER (ADHD), PREDOMINANTLY HYPERACTIVE TYPE: ICD-10-CM

## 2023-07-10 RX ORDER — LISDEXAMFETAMINE DIMESYLATE CAPSULES 20 MG/1
20 CAPSULE ORAL EVERY MORNING
Qty: 30 CAPSULE | Refills: 0 | Status: SHIPPED | OUTPATIENT
Start: 2023-07-10 | End: 2023-08-10 | Stop reason: SDUPTHER

## 2023-08-10 DIAGNOSIS — F90.1 ATTENTION DEFICIT HYPERACTIVITY DISORDER (ADHD), PREDOMINANTLY HYPERACTIVE TYPE: ICD-10-CM

## 2023-08-10 RX ORDER — LISDEXAMFETAMINE DIMESYLATE CAPSULES 20 MG/1
20 CAPSULE ORAL EVERY MORNING
Qty: 30 CAPSULE | Refills: 0 | Status: SHIPPED | OUTPATIENT
Start: 2023-08-10 | End: 2023-09-11 | Stop reason: SDUPTHER

## 2023-08-10 NOTE — TELEPHONE ENCOUNTER
No care due was identified.  Health [facility] Embedded Care Due Messages. Reference number: [id number].   8/10/2023 8:57:14 AM CDT

## 2023-09-11 ENCOUNTER — OFFICE VISIT (OUTPATIENT)
Dept: INTERNAL MEDICINE | Facility: CLINIC | Age: 38
End: 2023-09-11
Payer: COMMERCIAL

## 2023-09-11 DIAGNOSIS — F90.1 ATTENTION DEFICIT HYPERACTIVITY DISORDER (ADHD), PREDOMINANTLY HYPERACTIVE TYPE: Primary | ICD-10-CM

## 2023-09-11 PROCEDURE — 1159F MED LIST DOCD IN RCRD: CPT | Mod: CPTII,95,, | Performed by: STUDENT IN AN ORGANIZED HEALTH CARE EDUCATION/TRAINING PROGRAM

## 2023-09-11 PROCEDURE — 3044F HG A1C LEVEL LT 7.0%: CPT | Mod: CPTII,95,, | Performed by: STUDENT IN AN ORGANIZED HEALTH CARE EDUCATION/TRAINING PROGRAM

## 2023-09-11 PROCEDURE — 99213 OFFICE O/P EST LOW 20 MIN: CPT | Mod: 95,,, | Performed by: STUDENT IN AN ORGANIZED HEALTH CARE EDUCATION/TRAINING PROGRAM

## 2023-09-11 PROCEDURE — 1159F PR MEDICATION LIST DOCUMENTED IN MEDICAL RECORD: ICD-10-PCS | Mod: CPTII,95,, | Performed by: STUDENT IN AN ORGANIZED HEALTH CARE EDUCATION/TRAINING PROGRAM

## 2023-09-11 PROCEDURE — 99213 PR OFFICE/OUTPT VISIT, EST, LEVL III, 20-29 MIN: ICD-10-PCS | Mod: 95,,, | Performed by: STUDENT IN AN ORGANIZED HEALTH CARE EDUCATION/TRAINING PROGRAM

## 2023-09-11 PROCEDURE — 1160F PR REVIEW ALL MEDS BY PRESCRIBER/CLIN PHARMACIST DOCUMENTED: ICD-10-PCS | Mod: CPTII,95,, | Performed by: STUDENT IN AN ORGANIZED HEALTH CARE EDUCATION/TRAINING PROGRAM

## 2023-09-11 PROCEDURE — 3044F PR MOST RECENT HEMOGLOBIN A1C LEVEL <7.0%: ICD-10-PCS | Mod: CPTII,95,, | Performed by: STUDENT IN AN ORGANIZED HEALTH CARE EDUCATION/TRAINING PROGRAM

## 2023-09-11 PROCEDURE — 1160F RVW MEDS BY RX/DR IN RCRD: CPT | Mod: CPTII,95,, | Performed by: STUDENT IN AN ORGANIZED HEALTH CARE EDUCATION/TRAINING PROGRAM

## 2023-09-11 RX ORDER — LISDEXAMFETAMINE DIMESYLATE CAPSULES 20 MG/1
20 CAPSULE ORAL EVERY MORNING
Qty: 30 CAPSULE | Refills: 0 | Status: SHIPPED | OUTPATIENT
Start: 2023-09-11 | End: 2023-10-06 | Stop reason: SDUPTHER

## 2023-09-11 NOTE — PROGRESS NOTES
Subjective:       Patient ID: Moiz Galarza is a 38 y.o. male.    Chief Complaint: ADHD (Medication Management)      The patient location is: [place], [place]  The chief complaint leading to consultation is: Medication management.     Visit type: audiovisual    Face to Face time with patient: 3 minutes  10 minutes of total time spent on the encounter, which includes face to face time and non-face to face time preparing to see the patient (eg, review of tests), Obtaining and/or reviewing separately obtained history, Documenting clinical information in the electronic or other health record, Independently interpreting results (not separately reported) and communicating results to the patient/family/caregiver, or Care coordination (not separately reported).         Each patient to whom he or she provides medical services by telemedicine is:  (1) informed of the relationship between the physician and patient and the respective role of any other health care provider with respect to management of the patient; and (2) notified that he or she may decline to receive medical services by telemedicine and may withdraw from such care at any time.    Notes:     HPI  Medication: Vyvanse 20 mg qd  Symptoms Controlled: Yes  Medication Holidays: Infrequently on the weekends.   Adverse Effects: No palpitations, headaches, decreased appetite, heightened anxiety, disrupted sleep.    reviewed: No abnormal filling patterns.     Review of Systems   Constitutional:  Negative for activity change and unexpected weight change.   HENT:  Negative for hearing loss, rhinorrhea and trouble swallowing.    Eyes:  Negative for discharge and visual disturbance.   Respiratory:  Negative for chest tightness and wheezing.    Cardiovascular:  Negative for chest pain and palpitations.   Gastrointestinal:  Negative for blood in stool, constipation, diarrhea and vomiting.   Endocrine: Negative for polydipsia and polyuria.   Genitourinary:  Negative for  difficulty urinating, hematuria and urgency.   Musculoskeletal:  Negative for arthralgias, joint swelling and neck pain.   Neurological:  Negative for weakness and headaches.   Psychiatric/Behavioral:  Negative for confusion and dysphoric mood.          Objective:     Gen: Well Appearing, NAD, conversant.   HEENT: PERR, EOMI  Chest: Normal work of breathing  Resp: No Conversational Dyspnea  Neuro: A&Ox3,  speech normal  Mood: Mood normal, behavior normal, thought process linear     Assessment:       Problem List Items Addressed This Visit          Psychiatric    Attention deficit hyperactivity disorder (ADHD), predominantly hyperactive type - Primary           Plan:       1. Attention deficit hyperactivity disorder (ADHD), predominantly hyperactive type  Overview:  Vyvanse 20 mg qd    Assessment & Plan:  Controlled on current regimen. Continue.    reviewed.   In-person visit in 3 months.     Orders:  -     lisdexamfetamine (VYVANSE) 20 MG capsule; Take 1 capsule (20 mg total) by mouth every morning.  Dispense: 30 capsule; Refill: 0          RTC in 3 months      Naeem Brown MD  Family Medicine  [facility]  09/11/2023

## 2023-10-06 DIAGNOSIS — F90.1 ATTENTION DEFICIT HYPERACTIVITY DISORDER (ADHD), PREDOMINANTLY HYPERACTIVE TYPE: ICD-10-CM

## 2023-10-06 RX ORDER — LISDEXAMFETAMINE DIMESYLATE CAPSULES 20 MG/1
20 CAPSULE ORAL EVERY MORNING
Qty: 30 CAPSULE | Refills: 0 | Status: SHIPPED | OUTPATIENT
Start: 2023-10-06 | End: 2023-11-06 | Stop reason: SDUPTHER

## 2023-10-06 NOTE — TELEPHONE ENCOUNTER
No care due was identified.  [facility] Embedded Care Due Messages. Reference number: [id number].   10/06/2023 12:51:55 PM CDT

## 2023-11-06 DIAGNOSIS — F90.1 ATTENTION DEFICIT HYPERACTIVITY DISORDER (ADHD), PREDOMINANTLY HYPERACTIVE TYPE: ICD-10-CM

## 2023-11-06 RX ORDER — LISDEXAMFETAMINE DIMESYLATE CAPSULES 20 MG/1
20 CAPSULE ORAL EVERY MORNING
Qty: 30 CAPSULE | Refills: 0 | Status: SHIPPED | OUTPATIENT
Start: 2023-11-06 | End: 2023-12-11 | Stop reason: SDUPTHER

## 2023-11-06 NOTE — TELEPHONE ENCOUNTER
No care due was identified.  Health [facility] Embedded Care Due Messages. Reference number: [id number].   11/06/2023 10:19:10 AM CST

## 2023-11-16 ENCOUNTER — ON-DEMAND VIRTUAL (OUTPATIENT)
Dept: URGENT CARE | Facility: CLINIC | Age: 38
End: 2023-11-16
Payer: COMMERCIAL

## 2023-11-16 DIAGNOSIS — B00.9 HERPES SIMPLEX VIRUS (HSV) INFECTION: Primary | ICD-10-CM

## 2023-11-16 PROCEDURE — 99213 PR OFFICE/OUTPT VISIT, EST, LEVL III, 20-29 MIN: ICD-10-PCS | Mod: 95,,, | Performed by: PHYSICIAN ASSISTANT

## 2023-11-16 PROCEDURE — 99213 OFFICE O/P EST LOW 20 MIN: CPT | Mod: 95,,, | Performed by: PHYSICIAN ASSISTANT

## 2023-11-16 RX ORDER — VALACYCLOVIR HYDROCHLORIDE 1 G/1
1000 TABLET, FILM COATED ORAL DAILY
Qty: 5 TABLET | Refills: 0 | Status: SHIPPED | OUTPATIENT
Start: 2023-11-16 | End: 2023-12-11 | Stop reason: SDUPTHER

## 2023-11-17 NOTE — PATIENT INSTRUCTIONS
1. I have refilled your valacyclovir for current symptoms.    2. If no improvement in 4-5 days or sudden worsening of pain or new symptoms please go to urgent care for in person evaluation.   3. Please follow up with your primary care provider for continued management.  4. You must understand that you've received a Telehealth Urgent Care treatment only and that you may be released before all your medical problems are known or treated. You, the patient, will arrange for follow up care as instructed.

## 2023-11-17 NOTE — PROGRESS NOTES
Subjective:      Patient ID: Moiz Galarza is a 38 y.o. male.    Vitals:  vitals were not taken for this visit.     Chief Complaint: nasal lesions      Visit Type: TELE AUDIOVISUAL    Present with the patient at the time of consultation: TELEMED PRESENT WITH PATIENT: None    Past Medical History:   Diagnosis Date    ADHD (attention deficit hyperactivity disorder)      Past Surgical History:   Procedure Laterality Date    VASECTOMY       Review of patient's allergies indicates:  No Known Allergies  Current Outpatient Medications on File Prior to Visit   Medication Sig Dispense Refill    lisdexamfetamine (VYVANSE) 20 MG capsule Take 1 capsule (20 mg total) by mouth every morning. 30 capsule 0    [DISCONTINUED] valACYclovir (VALTREX) 1000 MG tablet        No current facility-administered medications on file prior to visit.     Family History   Problem Relation Age of Onset    No Known Problems Mother     No Known Problems Father     Breast cancer Maternal Aunt     Heart attack Maternal Grandfather        Medications Ordered                [id number] - [place], [place] - [address]   [address] [place] [address]    Telephone: [phone]   Fax: [phone]   Hours: Not open 24 hours                         E-Prescribed (1 of 1)              valACYclovir (VALTREX) 1000 MG tablet    Sig: Take 1 tablet (1,000 mg total) by mouth once daily. for 5 days       Start: 11/16/23     Quantity: 5 tablet Refills: 0                           Ohs Peq Odvv Intake    11/16/2023  7:06 PM CST - Filed by Patient   Describe your reason for todays visit Need for valtrex generic   What is your current physical address in the event of a medical emergency? [address] [place] [id number]   Are you able to take your vital signs? No   Please attach any relevant images or files          HPI  39yo male presents with medication request refill of his valtrex generic. Hx of nasal HSV fever blisters, happens every few months.  Recently moved here and forgot to discuss wit his new PCP. Recent episode started about 1-2 days ago, with pain and clogs up his breathing. Denies fevers, nasal drainage or epistaxis, oral or genital sores, rash elsewhere, muscle aches, n/v. Has taken 1g daily x5 days in the past with relief.         Constitution: Negative for chills, fever and generalized weakness.   HENT:  Negative for mouth sores, congestion and nosebleeds.         + fever blisters in nose per patient   Skin:  Negative for rash.        Objective:   The physical exam was conducted virtually.  Physical Exam   Constitutional: He is oriented to person, place, and time.  Non-toxic appearance. He does not appear ill. No distress.   HENT:   Head: Normocephalic and atraumatic.   Nose: No congestion.   Eyes: Conjunctivae are normal.   Neck: Neck supple.   Pulmonary/Chest: Effort normal. No respiratory distress.   Abdominal: Normal appearance.   Neurological: He is alert and oriented to person, place, and time. Coordination normal.   Skin: Skin is dry, not diaphoretic, not pale and no rash.   Psychiatric: His behavior is normal. Judgment and thought content normal.       Assessment:     1. Herpes simplex virus (HSV) infection        Plan:       Herpes simplex virus (HSV) infection  -     valACYclovir (VALTREX) 1000 MG tablet; Take 1 tablet (1,000 mg total) by mouth once daily. for 5 days  Dispense: 5 tablet; Refill: 0      1. I have refilled your valacyclovir for current symptoms.   2. If no improvement in 4-5 days or sudden worsening of pain or new symptoms please go to urgent care for in person evaluation.   3. Please follow up with your primary care provider for continued management.  4. You must understand that you've received a Telehealth Urgent Care treatment only and that you may be released before all your medical problems are known or treated. You, the patient, will arrange for follow up care as instructed.  ?Patient voiced understanding and  agrees to plan.

## 2023-12-11 ENCOUNTER — IMMUNIZATION (OUTPATIENT)
Dept: INTERNAL MEDICINE | Facility: CLINIC | Age: 38
End: 2023-12-11
Payer: COMMERCIAL

## 2023-12-11 ENCOUNTER — OFFICE VISIT (OUTPATIENT)
Dept: INTERNAL MEDICINE | Facility: CLINIC | Age: 38
End: 2023-12-11
Payer: COMMERCIAL

## 2023-12-11 VITALS
HEIGHT: 73 IN | WEIGHT: 195.56 LBS | SYSTOLIC BLOOD PRESSURE: 100 MMHG | DIASTOLIC BLOOD PRESSURE: 70 MMHG | BODY MASS INDEX: 25.92 KG/M2 | HEART RATE: 61 BPM | OXYGEN SATURATION: 99 %

## 2023-12-11 DIAGNOSIS — Z23 NEED FOR VACCINATION: Primary | ICD-10-CM

## 2023-12-11 DIAGNOSIS — F90.1 ATTENTION DEFICIT HYPERACTIVITY DISORDER (ADHD), PREDOMINANTLY HYPERACTIVE TYPE: Primary | ICD-10-CM

## 2023-12-11 DIAGNOSIS — Z23 NEED FOR INFLUENZA VACCINATION: ICD-10-CM

## 2023-12-11 DIAGNOSIS — B00.9 HERPES SIMPLEX VIRUS (HSV) INFECTION: ICD-10-CM

## 2023-12-11 PROCEDURE — 99214 OFFICE O/P EST MOD 30 MIN: CPT | Mod: S$GLB,,, | Performed by: STUDENT IN AN ORGANIZED HEALTH CARE EDUCATION/TRAINING PROGRAM

## 2023-12-11 PROCEDURE — 3008F BODY MASS INDEX DOCD: CPT | Mod: CPTII,S$GLB,, | Performed by: STUDENT IN AN ORGANIZED HEALTH CARE EDUCATION/TRAINING PROGRAM

## 2023-12-11 PROCEDURE — G0008 FLU VACCINE (QUAD) GREATER THAN OR EQUAL TO 3YO PRESERVATIVE FREE IM: ICD-10-PCS | Mod: S$GLB,,, | Performed by: STUDENT IN AN ORGANIZED HEALTH CARE EDUCATION/TRAINING PROGRAM

## 2023-12-11 PROCEDURE — 3044F HG A1C LEVEL LT 7.0%: CPT | Mod: CPTII,S$GLB,, | Performed by: STUDENT IN AN ORGANIZED HEALTH CARE EDUCATION/TRAINING PROGRAM

## 2023-12-11 PROCEDURE — 90686 FLU VACCINE (QUAD) GREATER THAN OR EQUAL TO 3YO PRESERVATIVE FREE IM: ICD-10-PCS | Mod: S$GLB,,, | Performed by: STUDENT IN AN ORGANIZED HEALTH CARE EDUCATION/TRAINING PROGRAM

## 2023-12-11 PROCEDURE — 99999 PR PBB SHADOW E&M-EST. PATIENT-LVL III: CPT | Mod: PBBFAC,,, | Performed by: STUDENT IN AN ORGANIZED HEALTH CARE EDUCATION/TRAINING PROGRAM

## 2023-12-11 PROCEDURE — 3078F DIAST BP <80 MM HG: CPT | Mod: CPTII,S$GLB,, | Performed by: STUDENT IN AN ORGANIZED HEALTH CARE EDUCATION/TRAINING PROGRAM

## 2023-12-11 PROCEDURE — 3074F SYST BP LT 130 MM HG: CPT | Mod: CPTII,S$GLB,, | Performed by: STUDENT IN AN ORGANIZED HEALTH CARE EDUCATION/TRAINING PROGRAM

## 2023-12-11 PROCEDURE — 1160F RVW MEDS BY RX/DR IN RCRD: CPT | Mod: CPTII,S$GLB,, | Performed by: STUDENT IN AN ORGANIZED HEALTH CARE EDUCATION/TRAINING PROGRAM

## 2023-12-11 PROCEDURE — G0008 ADMIN INFLUENZA VIRUS VAC: HCPCS | Mod: S$GLB,,, | Performed by: STUDENT IN AN ORGANIZED HEALTH CARE EDUCATION/TRAINING PROGRAM

## 2023-12-11 PROCEDURE — 3044F PR MOST RECENT HEMOGLOBIN A1C LEVEL <7.0%: ICD-10-PCS | Mod: CPTII,S$GLB,, | Performed by: STUDENT IN AN ORGANIZED HEALTH CARE EDUCATION/TRAINING PROGRAM

## 2023-12-11 PROCEDURE — 3008F PR BODY MASS INDEX (BMI) DOCUMENTED: ICD-10-PCS | Mod: CPTII,S$GLB,, | Performed by: STUDENT IN AN ORGANIZED HEALTH CARE EDUCATION/TRAINING PROGRAM

## 2023-12-11 PROCEDURE — 1159F PR MEDICATION LIST DOCUMENTED IN MEDICAL RECORD: ICD-10-PCS | Mod: CPTII,S$GLB,, | Performed by: STUDENT IN AN ORGANIZED HEALTH CARE EDUCATION/TRAINING PROGRAM

## 2023-12-11 PROCEDURE — 1160F PR REVIEW ALL MEDS BY PRESCRIBER/CLIN PHARMACIST DOCUMENTED: ICD-10-PCS | Mod: CPTII,S$GLB,, | Performed by: STUDENT IN AN ORGANIZED HEALTH CARE EDUCATION/TRAINING PROGRAM

## 2023-12-11 PROCEDURE — 3078F PR MOST RECENT DIASTOLIC BLOOD PRESSURE < 80 MM HG: ICD-10-PCS | Mod: CPTII,S$GLB,, | Performed by: STUDENT IN AN ORGANIZED HEALTH CARE EDUCATION/TRAINING PROGRAM

## 2023-12-11 PROCEDURE — 99214 PR OFFICE/OUTPT VISIT, EST, LEVL IV, 30-39 MIN: ICD-10-PCS | Mod: S$GLB,,, | Performed by: STUDENT IN AN ORGANIZED HEALTH CARE EDUCATION/TRAINING PROGRAM

## 2023-12-11 PROCEDURE — 99999 PR PBB SHADOW E&M-EST. PATIENT-LVL III: ICD-10-PCS | Mod: PBBFAC,,, | Performed by: STUDENT IN AN ORGANIZED HEALTH CARE EDUCATION/TRAINING PROGRAM

## 2023-12-11 PROCEDURE — 90686 IIV4 VACC NO PRSV 0.5 ML IM: CPT | Mod: S$GLB,,, | Performed by: STUDENT IN AN ORGANIZED HEALTH CARE EDUCATION/TRAINING PROGRAM

## 2023-12-11 PROCEDURE — 1159F MED LIST DOCD IN RCRD: CPT | Mod: CPTII,S$GLB,, | Performed by: STUDENT IN AN ORGANIZED HEALTH CARE EDUCATION/TRAINING PROGRAM

## 2023-12-11 PROCEDURE — 3074F PR MOST RECENT SYSTOLIC BLOOD PRESSURE < 130 MM HG: ICD-10-PCS | Mod: CPTII,S$GLB,, | Performed by: STUDENT IN AN ORGANIZED HEALTH CARE EDUCATION/TRAINING PROGRAM

## 2023-12-11 RX ORDER — LISDEXAMFETAMINE DIMESYLATE CAPSULES 20 MG/1
20 CAPSULE ORAL EVERY MORNING
Qty: 30 CAPSULE | Refills: 0 | Status: SHIPPED | OUTPATIENT
Start: 2023-12-11 | End: 2024-01-03 | Stop reason: SDUPTHER

## 2023-12-11 RX ORDER — VALACYCLOVIR HYDROCHLORIDE 1 G/1
1000 TABLET, FILM COATED ORAL DAILY
Qty: 5 TABLET | Refills: 6 | Status: SHIPPED | OUTPATIENT
Start: 2023-12-11 | End: 2023-12-27 | Stop reason: SDUPTHER

## 2023-12-11 NOTE — PROGRESS NOTES
SUBJECTIVE     Chief Complaint   Patient presents with    Follow-up    Medication Refill       HPI  Moiz Galarza is a 38 y.o. male with  ADHD  that presents for follow-up.     Medication: Vyvanse 20 mg qd  Symptoms Controlled: Yes  Medication Holidays: Occasionally on the weekends.   Adverse Effects: No palpitations, headaches, decreased appetite, heightened anxiety, disrupted sleep.    reviewed: Yes, no abnormal filling patterns.     Uses Valacyclovir intermittently for herpes labialis outbreaks. Recent outbreak, has since resolved. Requesting refill.    Inquires about flu shot today.       PAST MEDICAL HISTORY:  Past Medical History:   Diagnosis Date    ADHD (attention deficit hyperactivity disorder)        PAST SURGICAL HISTORY:  Past Surgical History:   Procedure Laterality Date    VASECTOMY         FAMILY HISTORY:  Family History   Problem Relation Age of Onset    No Known Problems Mother     No Known Problems Father     Breast cancer Maternal Aunt     Heart attack Maternal Grandfather        ALLERGIES AND MEDICATIONS: updated and reviewed.  Review of patient's allergies indicates:  No Known Allergies  Current Outpatient Medications   Medication Sig Dispense Refill    lisdexamfetamine (VYVANSE) 20 MG capsule Take 1 capsule (20 mg total) by mouth every morning. 30 capsule 0    valACYclovir (VALTREX) 1000 MG tablet Take 1 tablet (1,000 mg total) by mouth once daily. for 5 days 5 tablet 0     No current facility-administered medications for this visit.       ROS  Review of Systems   Constitutional:  Negative for activity change, chills and fever.   HENT:  Negative for congestion and hearing loss.    Eyes:  Negative for pain and visual disturbance.   Respiratory:  Negative for cough and shortness of breath.    Cardiovascular:  Negative for chest pain and palpitations.   Gastrointestinal:  Negative for abdominal pain, constipation, diarrhea, nausea and vomiting.   Endocrine: Negative.    Genitourinary: Negative.     Musculoskeletal:  Negative for arthralgias and myalgias.   Skin: Negative.    Allergic/Immunologic: Negative.    Neurological:  Negative for dizziness, light-headedness and headaches.   Hematological: Negative.          OBJECTIVE     Physical Exam  Vitals:    12/11/23 0803   BP: 100/70   Pulse: 61    Body mass index is 25.8 kg/m².            Physical Exam  Vitals reviewed.   Constitutional:       General: He is not in acute distress.     Appearance: Normal appearance.   HENT:      Head: Normocephalic and atraumatic.      Mouth/Throat:      Mouth: Mucous membranes are moist.      Pharynx: Oropharynx is clear.   Eyes:      Extraocular Movements: Extraocular movements intact.      Conjunctiva/sclera: Conjunctivae normal.      Pupils: Pupils are equal, round, and reactive to light.   Cardiovascular:      Rate and Rhythm: Normal rate and regular rhythm.      Pulses: Normal pulses.      Heart sounds: Normal heart sounds.   Pulmonary:      Effort: Pulmonary effort is normal.      Breath sounds: Normal breath sounds.   Abdominal:      General: There is no distension.   Musculoskeletal:         General: Normal range of motion.      Cervical back: Normal range of motion and neck supple.   Skin:     General: Skin is warm and dry.   Neurological:      General: No focal deficit present.      Mental Status: He is alert.           Health Maintenance         Date Due Completion Date    TETANUS VACCINE Never done ---    Influenza Vaccine (1) 09/01/2023 11/1/2019    COVID-19 Vaccine (4 - 2023-24 season) 09/01/2023 8/26/2021    Hemoglobin A1c (Diabetic Prevention Screening) 06/08/2026 6/8/2023    Lipid Panel 06/08/2028 6/8/2023              ASSESSMENT     38 y.o. male with     1. Attention deficit hyperactivity disorder (ADHD), predominantly hyperactive type    2. Herpes simplex virus (HSV) infection    3. Need for influenza vaccination        PLAN:     1. Attention deficit hyperactivity disorder (ADHD), predominantly hyperactive  type  Overview:  Vyvanse 20 mg qd    Assessment & Plan:  - Controlled on current regimen.  reviewed. Continue.       Orders:  -     lisdexamfetamine (VYVANSE) 20 MG capsule; Take 1 capsule (20 mg total) by mouth every morning.  Dispense: 30 capsule; Refill: 0    2. Herpes simplex virus (HSV) infection  -     valACYclovir (VALTREX) 1000 MG tablet; Take 1 tablet (1,000 mg total) by mouth once daily.  Dispense: 5 tablet; Refill: 6    3. Need for influenza vaccination  Amenable to receiving.           RTC in 3 months for virtual visit for medication management.        Naeem Brown MD  Family Medicine  [facility]  12/11/2023    This document was created using voice recognition software (Opality*fotobabble Direct). Although it may be edited, this document may contain errors related to incorrect recognition of the spoken word. Please call the physician if clarification is needed.       No follow-ups on file.

## 2023-12-15 ENCOUNTER — OFFICE VISIT (OUTPATIENT)
Dept: URGENT CARE | Facility: CLINIC | Age: 38
End: 2023-12-15
Payer: COMMERCIAL

## 2023-12-15 VITALS
OXYGEN SATURATION: 98 % | BODY MASS INDEX: 25.84 KG/M2 | RESPIRATION RATE: 16 BRPM | HEART RATE: 70 BPM | HEIGHT: 73 IN | SYSTOLIC BLOOD PRESSURE: 124 MMHG | DIASTOLIC BLOOD PRESSURE: 80 MMHG | TEMPERATURE: 99 F | WEIGHT: 195 LBS

## 2023-12-15 DIAGNOSIS — J02.9 SORE THROAT: ICD-10-CM

## 2023-12-15 DIAGNOSIS — U07.1 COVID-19: Primary | ICD-10-CM

## 2023-12-15 DIAGNOSIS — R52 BODY ACHES: ICD-10-CM

## 2023-12-15 LAB
CTP QC/QA: YES
MOLECULAR STREP A: NEGATIVE
POC MOLECULAR INFLUENZA A AGN: NEGATIVE
POC MOLECULAR INFLUENZA B AGN: NEGATIVE
SARS-COV-2 AG RESP QL IA.RAPID: POSITIVE

## 2023-12-15 PROCEDURE — 87651 STREP A DNA AMP PROBE: CPT | Mod: QW,S$GLB,, | Performed by: FAMILY MEDICINE

## 2023-12-15 PROCEDURE — 99213 OFFICE O/P EST LOW 20 MIN: CPT | Mod: S$GLB,,, | Performed by: FAMILY MEDICINE

## 2023-12-15 PROCEDURE — 99213 PR OFFICE/OUTPT VISIT, EST, LEVL III, 20-29 MIN: ICD-10-PCS | Mod: S$GLB,,, | Performed by: FAMILY MEDICINE

## 2023-12-15 PROCEDURE — 87811 SARS CORONAVIRUS 2 ANTIGEN POCT, MANUAL READ: ICD-10-PCS | Mod: QW,S$GLB,, | Performed by: FAMILY MEDICINE

## 2023-12-15 PROCEDURE — 87502 POCT INFLUENZA A/B MOLECULAR: ICD-10-PCS | Mod: QW,S$GLB,, | Performed by: FAMILY MEDICINE

## 2023-12-15 PROCEDURE — 87651 POCT STREP A MOLECULAR: ICD-10-PCS | Mod: QW,S$GLB,, | Performed by: FAMILY MEDICINE

## 2023-12-15 PROCEDURE — 87502 INFLUENZA DNA AMP PROBE: CPT | Mod: QW,S$GLB,, | Performed by: FAMILY MEDICINE

## 2023-12-15 PROCEDURE — 87811 SARS-COV-2 COVID19 W/OPTIC: CPT | Mod: QW,S$GLB,, | Performed by: FAMILY MEDICINE

## 2023-12-15 RX ORDER — NIRMATRELVIR AND RITONAVIR 300-100 MG
KIT ORAL
Qty: 30 TABLET | Refills: 0 | Status: SHIPPED | OUTPATIENT
Start: 2023-12-15 | End: 2023-12-20

## 2023-12-15 NOTE — PROGRESS NOTES
"Subjective:      Patient ID: Moiz Galarza is a 38 y.o. male.    Vitals:  height is 6' 1" (1.854 m) and weight is 88.5 kg (195 lb). His oral temperature is 98.8 °F (37.1 °C). His blood pressure is 124/80 and his pulse is 70. His respiration is 16 and oxygen saturation is 98%.     Chief Complaint: Sinus Problem    Patient presents with c.o sinus pressure x 1 day. He also has a headache and body aches. Patient agrees to a flu test in clinic. No fever.         Sinus Problem  This is a new problem. The current episode started yesterday. The problem is unchanged. Associated symptoms include headaches, sinus pressure and a sore throat. Pertinent negatives include no chills. Treatments tried: nyquil; dayquil. The treatment provided mild relief.       Constitution: Negative for chills.   HENT:  Positive for sinus pressure and sore throat.    Neurological:  Positive for headaches.      Objective:     Physical Exam   Constitutional: He is oriented to person, place, and time. He appears well-developed. He is cooperative.  Non-toxic appearance. He does not appear ill. No distress.   HENT:   Head: Normocephalic and atraumatic.   Ears:   Right Ear: Hearing, tympanic membrane, external ear and ear canal normal.   Left Ear: Hearing, tympanic membrane, external ear and ear canal normal.   Nose: Nose normal. No mucosal edema, rhinorrhea or nasal deformity. No epistaxis. Right sinus exhibits no maxillary sinus tenderness and no frontal sinus tenderness. Left sinus exhibits no maxillary sinus tenderness and no frontal sinus tenderness.   Mouth/Throat: Uvula is midline and mucous membranes are normal. Mucous membranes are moist. No trismus in the jaw. Normal dentition. No uvula swelling. Posterior oropharyngeal erythema present. No oropharyngeal exudate or posterior oropharyngeal edema.   Eyes: Conjunctivae and lids are normal. No scleral icterus.   Neck: Trachea normal and phonation normal. Neck supple. No edema present. No erythema " present. No neck rigidity present.   Cardiovascular: Normal rate, regular rhythm, normal heart sounds and normal pulses.   Pulmonary/Chest: Effort normal and breath sounds normal. No respiratory distress. He has no decreased breath sounds. He has no rhonchi.   Abdominal: Normal appearance.   Musculoskeletal: Normal range of motion.         General: No deformity or edema. Normal range of motion.   Lymphadenopathy:     He has no cervical adenopathy.   Neurological: He is alert and oriented to person, place, and time. He exhibits normal muscle tone. Coordination normal.   Skin: Skin is warm, dry, intact, not diaphoretic and not pale.   Psychiatric: His speech is normal and behavior is normal. Judgment and thought content normal.   Nursing note and vitals reviewed.      Assessment:     1. COVID-19    2. Body aches    3. Sore throat        Plan:       COVID-19  -     nirmatrelvir-ritonavir (PAXLOVID) 300 mg (150 mg x 2)-100 mg copackaged tablets (EUA); Take 3 tablets by mouth 2 (two) times daily. Each dose contains 2 nirmatrelvir (pink tablets) and 1 ritonavir (white tablet). Take all 3 tablets together  Dispense: 30 tablet; Refill: 0    Body aches  -     POCT Influenza A/B MOLECULAR    Sore throat  -     POCT Strep A, Molecular  -     SARS Coronavirus 2 Antigen, POCT Manual Read    Pt or guardian provided educational materials and instructions regarding their visit diagnosis.

## 2023-12-18 ENCOUNTER — PATIENT MESSAGE (OUTPATIENT)
Dept: RESEARCH | Facility: HOSPITAL | Age: 38
End: 2023-12-18
Payer: COMMERCIAL

## 2023-12-27 DIAGNOSIS — B00.9 HERPES SIMPLEX VIRUS (HSV) INFECTION: ICD-10-CM

## 2023-12-27 RX ORDER — VALACYCLOVIR HYDROCHLORIDE 1 G/1
1000 TABLET, FILM COATED ORAL DAILY
Qty: 30 TABLET | Refills: 1 | Status: SHIPPED | OUTPATIENT
Start: 2023-12-27

## 2023-12-27 NOTE — TELEPHONE ENCOUNTER
Refill Routing Note   Medication(s) are not appropriate for processing by [facility] for the following reason(s):        Clarification of medication (Rx) details( PRN or chronic daily use qty adjusted)    [facility] action(s):  Defer        Medication Therapy Plan: SIG reads as chronic use but qty shows PRN use. Patient is requesting 30 tablets could indicated taking daily.      Appointments  past 12m or future 3m with PCP    Date Provider   Last Visit   12/11/2023 Naeem Brown MD   Next Visit   3/11/2024 Naeem Brown MD   ED visits in past 90 days: 0        Note composed:9:05 AM 12/27/2023

## 2023-12-27 NOTE — TELEPHONE ENCOUNTER
No care due was identified.  Health [facility] Embedded Care Due Messages. Reference number: [id number].   12/27/2023 8:47:48 AM CST

## 2024-01-03 DIAGNOSIS — F90.1 ATTENTION DEFICIT HYPERACTIVITY DISORDER (ADHD), PREDOMINANTLY HYPERACTIVE TYPE: ICD-10-CM

## 2024-01-03 RX ORDER — LISDEXAMFETAMINE DIMESYLATE CAPSULES 20 MG/1
20 CAPSULE ORAL EVERY MORNING
Qty: 30 CAPSULE | Refills: 0 | Status: SHIPPED | OUTPATIENT
Start: 2024-01-03 | End: 2024-01-05 | Stop reason: SDUPTHER

## 2024-01-03 NOTE — TELEPHONE ENCOUNTER
No care due was identified.  [facility] Embedded Care Due Messages. Reference number: [id number].   1/03/2024 8:51:57 AM CST

## 2024-01-04 ENCOUNTER — PATIENT MESSAGE (OUTPATIENT)
Dept: INTERNAL MEDICINE | Facility: CLINIC | Age: 39
End: 2024-01-04
Payer: COMMERCIAL

## 2024-01-04 DIAGNOSIS — F90.1 ATTENTION DEFICIT HYPERACTIVITY DISORDER (ADHD), PREDOMINANTLY HYPERACTIVE TYPE: ICD-10-CM

## 2024-01-05 RX ORDER — LISDEXAMFETAMINE DIMESYLATE CAPSULES 30 MG/1
30 CAPSULE ORAL EVERY MORNING
Qty: 30 CAPSULE | Refills: 0 | Status: SHIPPED | OUTPATIENT
Start: 2024-01-05 | End: 2024-01-08

## 2024-01-08 RX ORDER — LISDEXAMFETAMINE DIMESYLATE CAPSULES 30 MG/1
30 CAPSULE ORAL EVERY MORNING
Qty: 30 CAPSULE | Refills: 0 | Status: SHIPPED | OUTPATIENT
Start: 2024-01-08 | End: 2024-01-09 | Stop reason: SDUPTHER

## 2024-01-09 RX ORDER — LISDEXAMFETAMINE DIMESYLATE CAPSULES 30 MG/1
30 CAPSULE ORAL EVERY MORNING
Qty: 30 CAPSULE | Refills: 0 | Status: SHIPPED | OUTPATIENT
Start: 2024-01-09 | End: 2024-02-05 | Stop reason: SDUPTHER

## 2024-02-05 DIAGNOSIS — F90.1 ATTENTION DEFICIT HYPERACTIVITY DISORDER (ADHD), PREDOMINANTLY HYPERACTIVE TYPE: ICD-10-CM

## 2024-02-05 RX ORDER — LISDEXAMFETAMINE DIMESYLATE CAPSULES 20 MG/1
20 CAPSULE ORAL EVERY MORNING
Qty: 30 CAPSULE | Refills: 0 | Status: SHIPPED | OUTPATIENT
Start: 2024-02-05 | End: 2024-05-06 | Stop reason: SDUPTHER

## 2024-02-05 NOTE — TELEPHONE ENCOUNTER
No care due was identified.  Health [facility] Embedded Care Due Messages. Reference number: [id number].   2/05/2024 9:16:31 AM CST

## 2024-02-06 ENCOUNTER — PATIENT MESSAGE (OUTPATIENT)
Dept: INTERNAL MEDICINE | Facility: CLINIC | Age: 39
End: 2024-02-06
Payer: COMMERCIAL

## 2024-02-06 DIAGNOSIS — F90.1 ATTENTION DEFICIT HYPERACTIVITY DISORDER (ADHD), PREDOMINANTLY HYPERACTIVE TYPE: Primary | ICD-10-CM

## 2024-02-07 RX ORDER — DEXTROAMPHETAMINE SACCHARATE, AMPHETAMINE ASPARTATE MONOHYDRATE, DEXTROAMPHETAMINE SULFATE AND AMPHETAMINE SULFATE 7.5; 7.5; 7.5; 7.5 MG/1; MG/1; MG/1; MG/1
30 CAPSULE, EXTENDED RELEASE ORAL EVERY MORNING
Qty: 30 CAPSULE | Refills: 0 | Status: SHIPPED | OUTPATIENT
Start: 2024-02-07 | End: 2024-03-05 | Stop reason: SDUPTHER

## 2024-03-05 DIAGNOSIS — F90.1 ATTENTION DEFICIT HYPERACTIVITY DISORDER (ADHD), PREDOMINANTLY HYPERACTIVE TYPE: ICD-10-CM

## 2024-03-05 RX ORDER — DEXTROAMPHETAMINE SACCHARATE, AMPHETAMINE ASPARTATE MONOHYDRATE, DEXTROAMPHETAMINE SULFATE AND AMPHETAMINE SULFATE 7.5; 7.5; 7.5; 7.5 MG/1; MG/1; MG/1; MG/1
30 CAPSULE, EXTENDED RELEASE ORAL EVERY MORNING
Qty: 30 CAPSULE | Refills: 0 | Status: SHIPPED | OUTPATIENT
Start: 2024-03-05 | End: 2024-03-11 | Stop reason: SDUPTHER

## 2024-03-05 NOTE — TELEPHONE ENCOUNTER
Care Due:                  Date            Visit Type   Department     Provider  --------------------------------------------------------------------------------                                EP                               PRIMARY      [facility] INTERNAL  Last Visit: 12-      CARE ([facility])   MEDICINE       Naeem BENZ [facility]                              ESTABLISHED                              PATIENT -    [facility] INTERNAL  Next Visit: 03-      VIRTUAL      MEDICINE       Naeem BENZ [facility]                                                            Last  Test          Frequency    Reason                     Performed    Due Date  --------------------------------------------------------------------------------    CBC.........  12 months..  valACYclovir.............  06- 06-    Cr..........  12 months..  valACYclovir.............  06- 06-    [facility] Embedded Care Due Messages. Reference number: [id number].   3/05/2024 8:34:57 AM CST

## 2024-03-11 ENCOUNTER — OFFICE VISIT (OUTPATIENT)
Dept: INTERNAL MEDICINE | Facility: CLINIC | Age: 39
End: 2024-03-11
Payer: COMMERCIAL

## 2024-03-11 DIAGNOSIS — F90.1 ATTENTION DEFICIT HYPERACTIVITY DISORDER (ADHD), PREDOMINANTLY HYPERACTIVE TYPE: ICD-10-CM

## 2024-03-11 PROCEDURE — 99213 OFFICE O/P EST LOW 20 MIN: CPT | Mod: 95,,, | Performed by: STUDENT IN AN ORGANIZED HEALTH CARE EDUCATION/TRAINING PROGRAM

## 2024-03-11 PROCEDURE — 1159F MED LIST DOCD IN RCRD: CPT | Mod: CPTII,95,, | Performed by: STUDENT IN AN ORGANIZED HEALTH CARE EDUCATION/TRAINING PROGRAM

## 2024-03-11 PROCEDURE — 1160F RVW MEDS BY RX/DR IN RCRD: CPT | Mod: CPTII,95,, | Performed by: STUDENT IN AN ORGANIZED HEALTH CARE EDUCATION/TRAINING PROGRAM

## 2024-03-11 RX ORDER — DEXTROAMPHETAMINE SACCHARATE, AMPHETAMINE ASPARTATE MONOHYDRATE, DEXTROAMPHETAMINE SULFATE AND AMPHETAMINE SULFATE 7.5; 7.5; 7.5; 7.5 MG/1; MG/1; MG/1; MG/1
30 CAPSULE, EXTENDED RELEASE ORAL EVERY MORNING
Qty: 30 CAPSULE | Refills: 0 | Status: SHIPPED | OUTPATIENT
Start: 2024-04-05 | End: 2024-05-06

## 2024-03-11 NOTE — PROGRESS NOTES
Subjective:       Patient ID: Moiz Galarza is a 38 y.o. male.    Chief Complaint: No chief complaint on file.      The patient location is: [place], [place]  The chief complaint leading to consultation is: Medication Management, ADHD    Visit type: audiovisual    Face to Face time with patient: 4 minutes  15 minutes of total time spent on the encounter, which includes face to face time and non-face to face time preparing to see the patient (eg, review of tests), Obtaining and/or reviewing separately obtained history, Documenting clinical information in the electronic or other health record, Independently interpreting results (not separately reported) and communicating results to the patient/family/caregiver, or Care coordination (not separately reported).         Each patient to whom he or she provides medical services by telemedicine is:  (1) informed of the relationship between the physician and patient and the respective role of any other health care provider with respect to management of the patient; and (2) notified that he or she may decline to receive medical services by telemedicine and may withdraw from such care at any time.    Notes:     HPI    Medication: Adderall XR 30 mg qd. Previously on Vyvanse 20 mg qd, but had to switch to Adderall due to supply issues.   Symptoms Controlled: Yes  Medication Holidays: Occasionally on the weekends.   Adverse Effects: No palpitations, headaches, decreased appetite, heightened anxiety, disrupted sleep.    reviewed: Yes, no abnormal filling patterns.        Review of Systems   Constitutional:  Negative for activity change and unexpected weight change.   HENT:  Negative for hearing loss, rhinorrhea and trouble swallowing.    Eyes:  Negative for discharge and visual disturbance.   Respiratory:  Negative for chest tightness and wheezing.    Cardiovascular:  Negative for chest pain and palpitations.   Gastrointestinal:  Negative for blood in stool, constipation,  diarrhea and vomiting.   Endocrine: Negative for polydipsia and polyuria.   Genitourinary:  Negative for difficulty urinating, hematuria and urgency.   Musculoskeletal:  Negative for arthralgias, joint swelling and neck pain.   Neurological:  Negative for weakness and headaches.   Psychiatric/Behavioral:  Negative for confusion and dysphoric mood.          Objective:     Gen: Well Appearing, NAD, conversant.   HEENT: PERR, EOMI  Chest: Normal work of breathing  Resp: No Conversational Dyspnea  Neuro: A&Ox3,  speech normal  Mood: Mood normal, behavior normal, thought process linear     Assessment:       Problem List Items Addressed This Visit          Psychiatric    Attention deficit hyperactivity disorder (ADHD), predominantly hyperactive type    Relevant Medications    dextroamphetamine-amphetamine (ADDERALL XR) 30 MG 24 hr capsule (Start on 4/5/2024)       Plan:       Moiz was seen today for medication management and adhd.    Diagnoses and all orders for this visit:    Attention deficit hyperactivity disorder (ADHD), predominantly hyperactive type  Controlled on current regimen. Continue.    reviewed.   -     dextroamphetamine-amphetamine (ADDERALL XR) 30 MG 24 hr capsule; Take 1 capsule (30 mg total) by mouth every morning.           RTC in 3 months      Naeem Brown MD  Family Medicine  [facility]  03/11/2024

## 2024-05-06 ENCOUNTER — PATIENT MESSAGE (OUTPATIENT)
Dept: INTERNAL MEDICINE | Facility: CLINIC | Age: 39
End: 2024-05-06
Payer: COMMERCIAL

## 2024-05-06 DIAGNOSIS — F90.1 ATTENTION DEFICIT HYPERACTIVITY DISORDER (ADHD), PREDOMINANTLY HYPERACTIVE TYPE: ICD-10-CM

## 2024-05-06 RX ORDER — LISDEXAMFETAMINE DIMESYLATE CAPSULES 20 MG/1
20 CAPSULE ORAL EVERY MORNING
Qty: 30 CAPSULE | Refills: 0 | Status: SHIPPED | OUTPATIENT
Start: 2024-05-06 | End: 2024-05-30 | Stop reason: SDUPTHER

## 2024-05-30 DIAGNOSIS — F90.1 ATTENTION DEFICIT HYPERACTIVITY DISORDER (ADHD), PREDOMINANTLY HYPERACTIVE TYPE: ICD-10-CM

## 2024-05-30 RX ORDER — LISDEXAMFETAMINE DIMESYLATE CAPSULES 20 MG/1
20 CAPSULE ORAL EVERY MORNING
Qty: 30 CAPSULE | Refills: 0 | Status: CANCELLED | OUTPATIENT
Start: 2024-05-30

## 2024-05-30 RX ORDER — LISDEXAMFETAMINE DIMESYLATE CAPSULES 20 MG/1
20 CAPSULE ORAL EVERY MORNING
Qty: 30 CAPSULE | Refills: 0 | Status: SHIPPED | OUTPATIENT
Start: 2024-05-30

## 2024-05-30 NOTE — TELEPHONE ENCOUNTER
Refill Routing Note   Medication(s) are not appropriate for processing by [facility] for the following reason(s):        Outside of protocol    [facility] action(s):  Route               Appointments  past 12m or future 3m with PCP    Date Provider   Last Visit   3/11/2024 Naeem Brown MD   Next Visit   Visit date not found Naeem Brown MD   ED visits in past 90 days: 0        Note composed:9:06 AM 05/30/2024

## 2024-06-10 ENCOUNTER — PATIENT MESSAGE (OUTPATIENT)
Dept: INTERNAL MEDICINE | Facility: CLINIC | Age: 39
End: 2024-06-10
Payer: COMMERCIAL

## 2024-06-24 DIAGNOSIS — F90.1 ATTENTION DEFICIT HYPERACTIVITY DISORDER (ADHD), PREDOMINANTLY HYPERACTIVE TYPE: ICD-10-CM

## 2024-06-24 RX ORDER — LISDEXAMFETAMINE DIMESYLATE CAPSULES 20 MG/1
20 CAPSULE ORAL EVERY MORNING
Qty: 30 CAPSULE | Refills: 0 | Status: SHIPPED | OUTPATIENT
Start: 2024-06-24

## 2024-06-24 NOTE — TELEPHONE ENCOUNTER
No care due was identified.  [facility] Embedded Care Due Messages. Reference number: [id number].   6/24/2024 5:40:56 AM CDT

## 2024-06-29 ENCOUNTER — PATIENT MESSAGE (OUTPATIENT)
Dept: INTERNAL MEDICINE | Facility: CLINIC | Age: 39
End: 2024-06-29
Payer: COMMERCIAL

## 2024-07-12 DIAGNOSIS — B00.9 HERPES SIMPLEX VIRUS (HSV) INFECTION: ICD-10-CM

## 2024-07-12 RX ORDER — VALACYCLOVIR HYDROCHLORIDE 1 G/1
1000 TABLET, FILM COATED ORAL DAILY
Qty: 30 TABLET | Refills: 1 | Status: SHIPPED | OUTPATIENT
Start: 2024-07-12

## 2024-07-12 NOTE — TELEPHONE ENCOUNTER
Care Due:                  Date            Visit Type   Department     Provider  --------------------------------------------------------------------------------                                ESTABLISHED                              PATIENT -    [facility] INTERNAL  Last Visit: 03-      [facility]      MEDICINE       Naeem Brown  Next Visit: None Scheduled  None         None Found                                                            Last  Test          Frequency    Reason                     Performed    Due Date  --------------------------------------------------------------------------------    CBC.........  12 months..  valACYclovir.............  06- 06-    Cr..........  12 months..  valACYclovir.............  06- 06-    [employer] Embedded Care Due Messages. Reference number: [id number].   7/12/2024 7:32:34 AM CDT   Mode: tile Fluence #2 (J/Cm2 Or Mj/Cm2): 6980 Spot Size: 2 x 2 cm Post-Care Instructions: I reviewed with the patient in detail post-care instructions. Patient should stay away from the sun and wear sun protection until treated areas are fully healed. Treatment Number: 48 Detail Level: Detailed Location #2: feet Location #1: hands Fluence Units: J/cm2 Consent: Written consent obtained, risks reviewed including but not limited to crusting, scabbing, blistering, scarring, darker or lighter pigmentary change, incidental hair removal, bruising, and/or incomplete removal. Total Square Area In Cm2 (Required For Proper Billing- Whole Numbers Only Please): 80

## 2024-08-02 DIAGNOSIS — F90.1 ATTENTION DEFICIT HYPERACTIVITY DISORDER (ADHD), PREDOMINANTLY HYPERACTIVE TYPE: ICD-10-CM

## 2024-08-02 RX ORDER — LISDEXAMFETAMINE DIMESYLATE 20 MG/1
20 CAPSULE ORAL EVERY MORNING
Qty: 30 CAPSULE | Refills: 0 | Status: CANCELLED | OUTPATIENT
Start: 2024-08-02

## 2024-08-02 NOTE — TELEPHONE ENCOUNTER
No care due was identified.  [facility] Embedded Care Due Messages. Reference number: [id number].   8/02/2024 7:19:47 AM CDT

## 2024-08-03 DIAGNOSIS — F90.1 ATTENTION DEFICIT HYPERACTIVITY DISORDER (ADHD), PREDOMINANTLY HYPERACTIVE TYPE: ICD-10-CM

## 2024-08-05 RX ORDER — LISDEXAMFETAMINE DIMESYLATE 20 MG/1
20 CAPSULE ORAL EVERY MORNING
Qty: 30 CAPSULE | Refills: 0 | Status: SHIPPED | OUTPATIENT
Start: 2024-08-05

## 2024-08-29 DIAGNOSIS — B00.9 HERPES SIMPLEX VIRUS (HSV) INFECTION: ICD-10-CM

## 2024-08-29 DIAGNOSIS — F90.1 ATTENTION DEFICIT HYPERACTIVITY DISORDER (ADHD), PREDOMINANTLY HYPERACTIVE TYPE: ICD-10-CM

## 2024-08-29 NOTE — TELEPHONE ENCOUNTER
No care due was identified.  [facility] Embedded Care Due Messages. Reference number: [id number].   8/29/2024 8:50:11 AM CDT

## 2024-09-03 ENCOUNTER — E-VISIT (OUTPATIENT)
Dept: INTERNAL MEDICINE | Facility: CLINIC | Age: 39
End: 2024-09-03
Payer: COMMERCIAL

## 2024-09-03 ENCOUNTER — PATIENT MESSAGE (OUTPATIENT)
Dept: INTERNAL MEDICINE | Facility: CLINIC | Age: 39
End: 2024-09-03
Payer: COMMERCIAL

## 2024-09-03 DIAGNOSIS — F90.1 ATTENTION DEFICIT HYPERACTIVITY DISORDER (ADHD), PREDOMINANTLY HYPERACTIVE TYPE: Primary | ICD-10-CM

## 2024-09-03 RX ORDER — LISDEXAMFETAMINE DIMESYLATE 20 MG/1
20 CAPSULE ORAL EVERY MORNING
Qty: 30 CAPSULE | Refills: 0 | Status: SHIPPED | OUTPATIENT
Start: 2024-09-03 | End: 2024-09-03 | Stop reason: SDUPTHER

## 2024-09-03 RX ORDER — LISDEXAMFETAMINE DIMESYLATE 20 MG/1
20 CAPSULE ORAL EVERY MORNING
Qty: 30 CAPSULE | Refills: 0 | Status: SHIPPED | OUTPATIENT
Start: 2024-10-03 | End: 2024-11-02

## 2024-09-03 RX ORDER — VALACYCLOVIR HYDROCHLORIDE 1 G/1
1000 TABLET, FILM COATED ORAL DAILY
Qty: 30 TABLET | Refills: 1 | Status: SHIPPED | OUTPATIENT
Start: 2024-09-03

## 2024-09-03 RX ORDER — LISDEXAMFETAMINE DIMESYLATE 20 MG/1
20 CAPSULE ORAL EVERY MORNING
Qty: 30 CAPSULE | Refills: 0 | Status: SHIPPED | OUTPATIENT
Start: 2024-09-03 | End: 2024-10-04

## 2024-09-03 RX ORDER — LISDEXAMFETAMINE DIMESYLATE 20 MG/1
20 CAPSULE ORAL EVERY MORNING
Qty: 30 CAPSULE | Refills: 0 | Status: SHIPPED | OUTPATIENT
Start: 2024-11-02 | End: 2024-12-02

## 2024-09-03 NOTE — PROGRESS NOTES
SUBJECTIVE     Chief Complaint   Patient presents with    ADD     Entered automatically based on patient selection in [id number].       HPI  Moiz Galarza is a 39 y.o. male with  ADHD  that presents for  E-Visit for ADHD medication management.     Question 9/3/2024  2:29 PM CDT - Filed by Patient   Do you agree to participate in an E-Visit? Yes   If you have any of the following symptoms, please present to your local emergency room or call 911: I acknowledge   Choose the state of your primary residence [place]   The following vitals are required in order to proceed    Systolic Blood Pressure: 110   Diastolic Blood Pressure: 75   Weight: 185   Height: 73   Pulse: 80   Reason for E-Visit Medication refill   What is the name of the medication(s)? Vyvanse   How often are you supposed to take your medication? 1 time daily   What is your current dose? The dose is usually listed in milligrams or milliliters on the label. 20 mg   Are you taking it as prescribed? Yes   Do you have any of the following side effects? None   How often do you take your medication as prescribed? Everyday   Have any of the following kept you from taking your medications as prescribed? (Select all that apply) Not needed   Would you like to change or continue your medication? Continue medication   Which option below best describes the reason for your request? I am out of refills and need more   What symptoms do you have? None   How would you describe your symptoms of Attention Deficit Disorder (ADD)? Unchanged   Have you been diagnosed with any new heart condition? No   Does anyone in your immediate family have a history of sudden cardiac arrest at a young age (younger than 50)? No   Does anyone in your immediate family have any of the following heart conditions: Hypertrophic Cardiomyopathy, Prolonged QT Syndrome, Brugada Syndrome or Griffin Parkinson White Syndrome? No   Provide any additional information you feel is important. No       PAST MEDICAL  HISTORY:  Past Medical History:   Diagnosis Date    ADHD (attention deficit hyperactivity disorder)        PAST SURGICAL HISTORY:  Past Surgical History:   Procedure Laterality Date    VASECTOMY         FAMILY HISTORY:  Family History   Problem Relation Name Age of Onset    No Known Problems Mother      No Known Problems Father      Breast cancer Maternal Aunt      Heart attack Maternal Grandfather         ALLERGIES AND MEDICATIONS: updated and reviewed.  Review of patient's allergies indicates:  No Known Allergies  Current Outpatient Medications   Medication Sig Dispense Refill    lisdexamfetamine (VYVANSE) 20 MG capsule Take 1 capsule (20 mg total) by mouth every morning. 30 capsule 0    valACYclovir (VALTREX) 1000 MG tablet Take 1 tablet (1,000 mg total) by mouth once daily. 30 tablet 1     No current facility-administered medications for this visit.       ROS  Review of Systems   All other systems reviewed and are negative.        OBJECTIVE     Unablet to obtain due to E-visit format    Health Maintenance         Date Due Completion Date    TETANUS VACCINE Never done ---    Influenza Vaccine (1) 09/01/2024 12/11/2023    COVID-19 Vaccine (4 - 2023-24 season) 09/01/2024 8/26/2021    Hemoglobin A1c (Diabetic Prevention Screening) 06/08/2026 6/8/2023    Lipid Panel 06/08/2028 6/8/2023              ASSESSMENT     39 y.o. male with     1. Attention deficit hyperactivity disorder (ADHD), predominantly hyperactive type        PLAN:     1. Attention deficit hyperactivity disorder (ADHD), predominantly hyperactive type  - Controlled on current regimen. Continue.   -  reviewed. No abnormal filling patterns.   - lisdexamfetamine (VYVANSE) 20 MG capsule; Take 1 capsule (20 mg total) by mouth every morning.  Dispense: 30 capsule; Refill: 0  - lisdexamfetamine (VYVANSE) 20 MG capsule; Take 1 capsule (20 mg total) by mouth every morning.  Dispense: 30 capsule; Refill: 0  - lisdexamfetamine (VYVANSE) 20 MG capsule; Take 1  capsule (20 mg total) by mouth every morning.  Dispense: 30 capsule; Refill: 0      I spent a total of 5 minutes completing an E-visit for this patient. During the E-visit, I reviewed the patient's symptom questionnaire, thoroughly examined their medical chart, and confirmed all documented allergies. After this comprehensive review, I developed an assessment and a subsequent care plan, which were then documented and communicated to the patient via a detailed message. This attestation is a truthful representation of the time and effort spent on this E-visit.         Naeem Brown MD  Family Medicine  [facility]  09/03/2024    This document was created using voice recognition software (M*Modal Fluency Direct). Although it may be edited, this document may contain errors related to incorrect recognition of the spoken word. Please call the physician if clarification is needed.       No follow-ups on file.

## 2024-09-25 DIAGNOSIS — F90.1 ATTENTION DEFICIT HYPERACTIVITY DISORDER (ADHD), PREDOMINANTLY HYPERACTIVE TYPE: ICD-10-CM

## 2024-09-25 NOTE — TELEPHONE ENCOUNTER
Care Due:                  Date            Visit Type   Department     Provider  --------------------------------------------------------------------------------                                ESTABLISHED                              PATIENT -    [facility] INTERNAL  Last Visit: 03-      [facility]      MEDICINE       Naeem Brown  Next Visit: None Scheduled  None         None Found                                                            Last  Test          Frequency    Reason                     Performed    Due Date  --------------------------------------------------------------------------------    CBC.........  12 months..  valACYclovir.............  06- 06-    Cr..........  12 months..  valACYclovir.............  06- 06-    [employer] Embedded Care Due Messages. Reference number: [id number].   9/25/2024 12:41:07 PM CDT

## 2024-10-03 ENCOUNTER — PATIENT MESSAGE (OUTPATIENT)
Dept: INTERNAL MEDICINE | Facility: CLINIC | Age: 39
End: 2024-10-03
Payer: COMMERCIAL

## 2024-10-03 RX ORDER — LISDEXAMFETAMINE DIMESYLATE 20 MG/1
20 CAPSULE ORAL EVERY MORNING
Qty: 30 CAPSULE | Refills: 0 | Status: SHIPPED | OUTPATIENT
Start: 2024-10-03 | End: 2024-11-02

## 2024-10-30 ENCOUNTER — E-VISIT (OUTPATIENT)
Dept: INTERNAL MEDICINE | Facility: CLINIC | Age: 39
End: 2024-10-30
Payer: COMMERCIAL

## 2024-10-30 DIAGNOSIS — F90.1 ATTENTION DEFICIT HYPERACTIVITY DISORDER (ADHD), PREDOMINANTLY HYPERACTIVE TYPE: Primary | ICD-10-CM

## 2024-10-31 RX ORDER — LISDEXAMFETAMINE DIMESYLATE 20 MG/1
20 CAPSULE ORAL EVERY MORNING
Qty: 30 CAPSULE | Refills: 0 | Status: SHIPPED | OUTPATIENT
Start: 2024-12-30 | End: 2025-01-29

## 2024-10-31 RX ORDER — LISDEXAMFETAMINE DIMESYLATE 20 MG/1
20 CAPSULE ORAL EVERY MORNING
Qty: 30 CAPSULE | Refills: 0 | Status: SHIPPED | OUTPATIENT
Start: 2024-11-30 | End: 2024-12-30

## 2024-10-31 RX ORDER — LISDEXAMFETAMINE DIMESYLATE 20 MG/1
20 CAPSULE ORAL EVERY MORNING
Qty: 30 CAPSULE | Refills: 0 | Status: SHIPPED | OUTPATIENT
Start: 2024-10-31 | End: 2024-12-01

## 2024-11-29 DIAGNOSIS — F90.1 ATTENTION DEFICIT HYPERACTIVITY DISORDER (ADHD), PREDOMINANTLY HYPERACTIVE TYPE: ICD-10-CM

## 2024-11-29 NOTE — TELEPHONE ENCOUNTER
Care Due:                  Date            Visit Type   Department     Provider  --------------------------------------------------------------------------------                                ESTABLISHED                              PATIENT -    [facility] INTER[facility]  Last Visit: 03-      [facility]      MEDICINE       Naeem Brown  Next Visit: None Scheduled  None         None Found                                                            Last  Test          Frequency    Reason                     Performed    Due Date  --------------------------------------------------------------------------------    CBC.........  12 months..  valACYclovir.............  06- 06-    Cr..........  12 months..  valACYclovir.............  06- 06-    [employer] Embedded Care Due Messages. Reference number: [id number].   11/29/2024 10:25:07 AM CST

## 2024-12-02 RX ORDER — LISDEXAMFETAMINE DIMESYLATE 20 MG/1
20 CAPSULE ORAL EVERY MORNING
Qty: 30 CAPSULE | Refills: 0 | Status: SHIPPED | OUTPATIENT
Start: 2024-12-02 | End: 2025-01-02

## 2025-02-02 DIAGNOSIS — B00.9 HERPES SIMPLEX VIRUS (HSV) INFECTION: ICD-10-CM

## 2025-02-02 DIAGNOSIS — F90.1 ATTENTION DEFICIT HYPERACTIVITY DISORDER (ADHD), PREDOMINANTLY HYPERACTIVE TYPE: ICD-10-CM

## 2025-02-02 NOTE — TELEPHONE ENCOUNTER
Care Due:                  Date            Visit Type   Department     Provider  --------------------------------------------------------------------------------                                ESTABLISHED                              PATIENT -    [facility] INTER[facility]  Last Visit: 03-      [facility]      MEDICINE       Naeem Brown  Next Visit: None Scheduled  None         None Found                                                            Last  Test          Frequency    Reason                     Performed    Due Date  --------------------------------------------------------------------------------    CBC.........  12 months..  valACYclovir.............  06- 06-    Cr..........  12 months..  valACYclovir.............  06- 06-    [employer] Embedded Care Due Messages. Reference number: [id number].   2/02/2025 8:06:59 AM CST

## 2025-02-02 NOTE — TELEPHONE ENCOUNTER
No care due was identified.  [facility] Embedded Care Due Messages. Reference number: [id number].   2/02/2025 9:08:09 AM CST

## 2025-02-03 RX ORDER — VALACYCLOVIR HYDROCHLORIDE 1 G/1
1000 TABLET, FILM COATED ORAL DAILY
Qty: 30 TABLET | Refills: 1 | Status: SHIPPED | OUTPATIENT
Start: 2025-02-03

## 2025-02-03 RX ORDER — LISDEXAMFETAMINE DIMESYLATE 20 MG/1
20 CAPSULE ORAL EVERY MORNING
Qty: 30 CAPSULE | Refills: 0 | Status: SHIPPED | OUTPATIENT
Start: 2025-02-03 | End: 2025-02-28 | Stop reason: SDUPTHER

## 2025-02-28 DIAGNOSIS — F90.1 ATTENTION DEFICIT HYPERACTIVITY DISORDER (ADHD), PREDOMINANTLY HYPERACTIVE TYPE: ICD-10-CM

## 2025-02-28 RX ORDER — LISDEXAMFETAMINE DIMESYLATE 20 MG/1
20 CAPSULE ORAL EVERY MORNING
Qty: 30 CAPSULE | Refills: 0 | Status: SHIPPED | OUTPATIENT
Start: 2025-02-28 | End: 2025-03-30

## 2025-02-28 NOTE — TELEPHONE ENCOUNTER
No care due was identified.  [facility] Embedded Care Due Messages. Reference number: [id number].   2/28/2025 8:27:11 AM CST

## 2025-03-31 DIAGNOSIS — F90.1 ATTENTION DEFICIT HYPERACTIVITY DISORDER (ADHD), PREDOMINANTLY HYPERACTIVE TYPE: ICD-10-CM

## 2025-03-31 RX ORDER — LISDEXAMFETAMINE DIMESYLATE 20 MG/1
20 CAPSULE ORAL EVERY MORNING
Qty: 30 CAPSULE | Refills: 0 | Status: SHIPPED | OUTPATIENT
Start: 2025-03-31 | End: 2025-05-02

## 2025-03-31 NOTE — TELEPHONE ENCOUNTER
Care Due:                  Date            Visit Type   Department     Provider  --------------------------------------------------------------------------------                                ESTABLISHED                              PATIENT -    [facility] INTERNAL  Last Visit: 03-      [facility]      MEDICINE       Naeem Brown  Next Visit: None Scheduled  None         None Found                                                            Last  Test          Frequency    Reason                     Performed    Due Date  --------------------------------------------------------------------------------    Office Visit  15 months..  valACYclovir.............  03- 06-    [facility] Embedded Care Due Messages. Reference number: [id number].   3/31/2025 6:55:13 AM CDT

## 2025-04-28 DIAGNOSIS — F90.1 ATTENTION DEFICIT HYPERACTIVITY DISORDER (ADHD), PREDOMINANTLY HYPERACTIVE TYPE: ICD-10-CM

## 2025-04-28 NOTE — TELEPHONE ENCOUNTER
Care Due:                  Date            Visit Type   Department     Provider  --------------------------------------------------------------------------------                                ESTABLISHED                              PATIENT -    [facility] INTER[facility]  Last Visit: 03-      [facility]      MEDICINE       Naeem Brown  Next Visit: None Scheduled  None         None Found                                                            Last  Test          Frequency    Reason                     Performed    Due Date  --------------------------------------------------------------------------------    CBC.........  12 months..  valACYclovir.............  06- 06-    Cr..........  12 months..  valACYclovir.............  06- 06-    [facility] Embedded Care Due Messages. Reference number: [id number].   4/28/2025 8:37:34 AM CDT

## 2025-04-28 NOTE — TELEPHONE ENCOUNTER
Refill Routing Note   Medication(s) are not appropriate for processing by [facility] for the following reason(s):        Outside of protocol    [facility] action(s):  Route     Requires labs : Yes             Appointments  past 12m or future 3m with PCP    Date Provider   Last Visit   10/30/2024 Naeem Brown MD   Next Visit   Visit date not found Naeem Brown MD   ED visits in past 90 days: 0        Note composed:8:55 AM 04/28/2025

## 2025-04-29 RX ORDER — LISDEXAMFETAMINE DIMESYLATE 20 MG/1
20 CAPSULE ORAL EVERY MORNING
Qty: 30 CAPSULE | Refills: 0 | Status: SHIPPED | OUTPATIENT
Start: 2025-04-29 | End: 2025-05-30

## 2025-05-27 ENCOUNTER — E-VISIT (OUTPATIENT)
Dept: INTERNAL MEDICINE | Facility: CLINIC | Age: 40
End: 2025-05-27
Payer: COMMERCIAL

## 2025-05-27 DIAGNOSIS — F90.1 ATTENTION DEFICIT HYPERACTIVITY DISORDER (ADHD), PREDOMINANTLY HYPERACTIVE TYPE: Primary | ICD-10-CM

## 2025-05-27 DIAGNOSIS — F90.1 ATTENTION DEFICIT HYPERACTIVITY DISORDER (ADHD), PREDOMINANTLY HYPERACTIVE TYPE: ICD-10-CM

## 2025-05-27 RX ORDER — LISDEXAMFETAMINE DIMESYLATE 20 MG/1
20 CAPSULE ORAL EVERY MORNING
Qty: 30 CAPSULE | Refills: 0 | Status: CANCELLED | OUTPATIENT
Start: 2025-05-27 | End: 2025-06-26

## 2025-05-27 NOTE — Clinical Note
Please call this patient to schedule an in-person visit in 3 months for annual/adhd medication management.   Thanks,  TP

## 2025-05-27 NOTE — TELEPHONE ENCOUNTER
No care due was identified.  [facility] Embedded Care Due Messages. Reference number: [id number].   5/27/2025 8:50:52 AM CDT

## 2025-05-28 ENCOUNTER — TELEPHONE (OUTPATIENT)
Dept: INTERNAL MEDICINE | Facility: CLINIC | Age: 40
End: 2025-05-28
Payer: COMMERCIAL

## 2025-05-28 RX ORDER — LISDEXAMFETAMINE DIMESYLATE 20 MG/1
20 CAPSULE ORAL EVERY MORNING
Qty: 30 CAPSULE | Refills: 0 | Status: SHIPPED | OUTPATIENT
Start: 2025-05-28 | End: 2025-06-28

## 2025-05-28 RX ORDER — LISDEXAMFETAMINE DIMESYLATE 20 MG/1
20 CAPSULE ORAL EVERY MORNING
Qty: 30 CAPSULE | Refills: 0 | Status: SHIPPED | OUTPATIENT
Start: 2025-07-27 | End: 2025-08-26

## 2025-05-28 RX ORDER — LISDEXAMFETAMINE DIMESYLATE 20 MG/1
20 CAPSULE ORAL EVERY MORNING
Qty: 30 CAPSULE | Refills: 0 | Status: SHIPPED | OUTPATIENT
Start: 2025-06-27 | End: 2025-07-27

## 2025-05-28 NOTE — PROGRESS NOTES
Patient ID: Moiz Galarza is a 40 y.o. male.        E-Visit Time Trackin minutes            Chief Complaint: ADD (Entered automatically based on patient selection in [employer].)      The patient initiated a request through [employer] on 2025 for evaluation and management with a chief complaint of ADD (Entered automatically based on patient selection in [employer].)     I evaluated the questionnaire submission on 2025.    [facility] Evisit Adhd    2025  9:38 PM CDT - Filed by Patient   Do you agree to participate in an E-Visit? Yes   If you have any of the following symptoms, please present to your local emergency room or call 911: I acknowledge   Choose the state of your primary residence [place]   The following vitals are required in order to proceed    Systolic Blood Pressure: 110   Diastolic Blood Pressure: 70   Weight: 190   Height: 73   Pulse: 80   Reason for E-Visit Medication refill   What is the name of the medication(s)?  Vyvanse   How often are you supposed to take your medication? 1 time daily   What is your current dose? The dose is usually listed in milligrams or milliliters on the label. 20 mg   Are you taking it as prescribed? Yes   Do you have any of the following side effects? None   How often do you take your medication as prescribed? Everyday   Have any of the following kept you from taking your medications as prescribed? (Select all that apply) Not needed   Would you like to change or continue your medication? Continue medication   Which option below best describes the reason for your request?  I am out of refills and need more   What symptoms do you have? None   How would you describe your symptoms of Attention Deficit Disorder (ADD)? Unchanged   Have you been diagnosed with any new heart condition? No   Does anyone in your immediate family have a history of sudden cardiac arrest at a young age (younger than 50)? No   Does anyone in your immediate family have any of the following  heart conditions: Hypertrophic Cardiomyopathy, Prolonged QT Syndrome, Brugada Syndrome or Griffin Parkinson White Syndrome? No   Provide any additional information you feel is important.    Please attach any relevant images or files          Encounter Diagnosis   Name Primary?    Attention deficit hyperactivity disorder (ADHD), predominantly hyperactive type Yes        No orders of the defined types were placed in this encounter.     Medications Ordered This Encounter   Medications    lisdexamfetamine (VYVANSE) 20 MG capsule     Sig: Take 1 capsule (20 mg total) by mouth every morning.     Dispense:  30 capsule     Refill:  0    lisdexamfetamine (VYVANSE) 20 MG capsule     Sig: Take 1 capsule (20 mg total) by mouth every morning.     Dispense:  30 capsule     Refill:  0    lisdexamfetamine (VYVANSE) 20 MG capsule     Sig: Take 1 capsule (20 mg total) by mouth every morning.     Dispense:  30 capsule     Refill:  0        No follow-ups on file.

## 2025-06-01 DIAGNOSIS — B00.9 HERPES SIMPLEX VIRUS (HSV) INFECTION: ICD-10-CM

## 2025-06-03 RX ORDER — VALACYCLOVIR HYDROCHLORIDE 1 G/1
1000 TABLET, FILM COATED ORAL DAILY
Qty: 30 TABLET | Refills: 1 | Status: SHIPPED | OUTPATIENT
Start: 2025-06-03

## 2025-06-26 ENCOUNTER — RESULTS FOLLOW-UP (OUTPATIENT)
Dept: INTERNAL MEDICINE | Facility: CLINIC | Age: 40
End: 2025-06-26

## 2025-06-26 ENCOUNTER — LAB VISIT (OUTPATIENT)
Dept: LAB | Facility: HOSPITAL | Age: 40
End: 2025-06-26
Attending: STUDENT IN AN ORGANIZED HEALTH CARE EDUCATION/TRAINING PROGRAM
Payer: COMMERCIAL

## 2025-06-26 ENCOUNTER — OFFICE VISIT (OUTPATIENT)
Dept: INTERNAL MEDICINE | Facility: CLINIC | Age: 40
End: 2025-06-26
Payer: COMMERCIAL

## 2025-06-26 VITALS
HEIGHT: 73 IN | SYSTOLIC BLOOD PRESSURE: 118 MMHG | DIASTOLIC BLOOD PRESSURE: 62 MMHG | HEART RATE: 51 BPM | BODY MASS INDEX: 25.83 KG/M2 | OXYGEN SATURATION: 99 % | WEIGHT: 194.88 LBS

## 2025-06-26 DIAGNOSIS — B00.9 HERPES SIMPLEX VIRUS (HSV) INFECTION: ICD-10-CM

## 2025-06-26 DIAGNOSIS — Z00.00 ANNUAL PHYSICAL EXAM: Primary | ICD-10-CM

## 2025-06-26 DIAGNOSIS — R17 SERUM TOTAL BILIRUBIN ELEVATED: ICD-10-CM

## 2025-06-26 DIAGNOSIS — F90.1 ATTENTION DEFICIT HYPERACTIVITY DISORDER (ADHD), PREDOMINANTLY HYPERACTIVE TYPE: ICD-10-CM

## 2025-06-26 LAB
ALBUMIN SERPL BCP-MCNC: 4.9 G/DL (ref 3.5–5.2)
ALP SERPL-CCNC: 49 UNIT/L (ref 40–150)
ALT SERPL W/O P-5'-P-CCNC: 22 UNIT/L (ref 10–44)
ANION GAP (OHS): 8 MMOL/L (ref 8–16)
AST SERPL-CCNC: 20 UNIT/L (ref 11–45)
BILIRUB SERPL-MCNC: 2.4 MG/DL (ref 0.1–1)
BUN SERPL-MCNC: 16 MG/DL (ref 6–20)
CALCIUM SERPL-MCNC: 9.5 MG/DL (ref 8.7–10.5)
CHLORIDE SERPL-SCNC: 104 MMOL/L (ref 95–110)
CO2 SERPL-SCNC: 28 MMOL/L (ref 23–29)
CREAT SERPL-MCNC: 1.1 MG/DL (ref 0.5–1.4)
GFR SERPLBLD CREATININE-BSD FMLA CKD-EPI: >60 ML/MIN/1.73/M2
GLUCOSE SERPL-MCNC: 91 MG/DL (ref 70–110)
POTASSIUM SERPL-SCNC: 4.4 MMOL/L (ref 3.5–5.1)
PROT SERPL-MCNC: 7.7 GM/DL (ref 6–8.4)
SODIUM SERPL-SCNC: 140 MMOL/L (ref 136–145)

## 2025-06-26 PROCEDURE — 84075 ASSAY ALKALINE PHOSPHATASE: CPT

## 2025-06-26 PROCEDURE — 99395 PREV VISIT EST AGE 18-39: CPT | Mod: S$GLB,,, | Performed by: STUDENT IN AN ORGANIZED HEALTH CARE EDUCATION/TRAINING PROGRAM

## 2025-06-26 PROCEDURE — 1159F MED LIST DOCD IN RCRD: CPT | Mod: CPTII,S$GLB,, | Performed by: STUDENT IN AN ORGANIZED HEALTH CARE EDUCATION/TRAINING PROGRAM

## 2025-06-26 PROCEDURE — 3078F DIAST BP <80 MM HG: CPT | Mod: CPTII,S$GLB,, | Performed by: STUDENT IN AN ORGANIZED HEALTH CARE EDUCATION/TRAINING PROGRAM

## 2025-06-26 PROCEDURE — 99999 PR PBB SHADOW E&M-EST. PATIENT-LVL III: CPT | Mod: PBBFAC,,, | Performed by: STUDENT IN AN ORGANIZED HEALTH CARE EDUCATION/TRAINING PROGRAM

## 2025-06-26 PROCEDURE — 1160F RVW MEDS BY RX/DR IN RCRD: CPT | Mod: CPTII,S$GLB,, | Performed by: STUDENT IN AN ORGANIZED HEALTH CARE EDUCATION/TRAINING PROGRAM

## 2025-06-26 PROCEDURE — 3074F SYST BP LT 130 MM HG: CPT | Mod: CPTII,S$GLB,, | Performed by: STUDENT IN AN ORGANIZED HEALTH CARE EDUCATION/TRAINING PROGRAM

## 2025-06-26 PROCEDURE — 3008F BODY MASS INDEX DOCD: CPT | Mod: CPTII,S$GLB,, | Performed by: STUDENT IN AN ORGANIZED HEALTH CARE EDUCATION/TRAINING PROGRAM

## 2025-06-26 PROCEDURE — 36415 COLL VENOUS BLD VENIPUNCTURE: CPT

## 2025-06-26 RX ORDER — LISDEXAMFETAMINE DIMESYLATE 20 MG/1
20 CAPSULE ORAL EVERY MORNING
Qty: 30 CAPSULE | Refills: 0 | Status: SHIPPED | OUTPATIENT
Start: 2025-06-26 | End: 2025-07-26

## 2025-06-26 RX ORDER — LISDEXAMFETAMINE DIMESYLATE 20 MG/1
20 CAPSULE ORAL EVERY MORNING
Qty: 30 CAPSULE | Refills: 0 | Status: SHIPPED | OUTPATIENT
Start: 2025-07-26 | End: 2025-08-25

## 2025-06-26 RX ORDER — LISDEXAMFETAMINE DIMESYLATE 20 MG/1
20 CAPSULE ORAL EVERY MORNING
Qty: 30 CAPSULE | Refills: 0 | Status: SHIPPED | OUTPATIENT
Start: 2025-08-25 | End: 2025-09-24

## 2025-06-26 RX ORDER — VALACYCLOVIR HYDROCHLORIDE 1 G/1
1000 TABLET, FILM COATED ORAL DAILY
Qty: 30 TABLET | Refills: 1 | Status: SHIPPED | OUTPATIENT
Start: 2025-06-26

## 2025-06-26 NOTE — PROGRESS NOTES
"SUBJECTIVE     Chief Complaint   Patient presents with    Annual Exam       HPI  Moiz Galarza is a 40 y.o. male with ADHD, HSV1 infection that presents for annual exam.     Medication:Vyvanse 20 mg qd  Symptoms Controlled: Yes  Medication Holidays: Occasionally on the weekends.   Adverse Effects: No palpitations, headaches, decreased appetite, heightened anxiety, disrupted sleep.    reviewed: Yes, no abnormal filling patterns.     Taking valtrex for fever blisters. Last outbreak was a month ago.     No concerns today. Staying active with exercise, eating regular diet.   PAST MEDICAL HISTORY:  Past Medical History:   Diagnosis Date    ADHD (attention deficit hyperactivity disorder)        PAST SURGICAL HISTORY:  Past Surgical History:   Procedure Laterality Date    VASECTOMY         FAMILY HISTORY:  Family History   Problem Relation Name Age of Onset    No Known Problems Mother      No Known Problems Father      Breast cancer Maternal Aunt      Heart attack Maternal Grandfather         ALLERGIES AND MEDICATIONS: updated and reviewed.  Review of patient's allergies indicates:  No Known Allergies  Current Medications[1]    ROS  Review of Systems   Constitutional: Negative.  Negative for chills and fever.   HENT: Negative.  Negative for congestion and sore throat.    Respiratory:  Negative for chest tightness and shortness of breath.    Cardiovascular:  Negative for chest pain and palpitations.   Gastrointestinal:  Negative for abdominal pain, constipation and diarrhea.   Genitourinary:  Negative for dysuria and frequency.   Musculoskeletal:  Negative for back pain, joint swelling and neck pain.   Skin: Negative.    Neurological:  Negative for dizziness, syncope and headaches.   Psychiatric/Behavioral: Negative.           OBJECTIVE     Physical Exam  Vitals:    06/26/25 0857   BP: 118/62   Pulse: (!) 51    Body mass index is 25.71 kg/m².  Weight: 88.4 kg (194 lb 14.2 oz)   Height: 6' 1" (185.4 cm)     Physical " Exam  Vitals reviewed.   Constitutional:       General: He is not in acute distress.     Appearance: Normal appearance.   HENT:      Head: Normocephalic and atraumatic.      Mouth/Throat:      Mouth: Mucous membranes are moist.      Pharynx: Oropharynx is clear.   Eyes:      Extraocular Movements: Extraocular movements intact.      Conjunctiva/sclera: Conjunctivae normal.      Pupils: Pupils are equal, round, and reactive to light.   Cardiovascular:      Rate and Rhythm: Normal rate and regular rhythm.      Pulses: Normal pulses.      Heart sounds: Normal heart sounds.   Pulmonary:      Effort: Pulmonary effort is normal.      Breath sounds: Normal breath sounds.   Abdominal:      General: There is no distension.   Musculoskeletal:         General: Normal range of motion.      Cervical back: Normal range of motion and neck supple.   Skin:     General: Skin is warm and dry.   Neurological:      General: No focal deficit present.      Mental Status: He is alert.           ASSESSMENT     40 y.o. male with     1. Annual physical exam    2. Attention deficit hyperactivity disorder (ADHD), predominantly hyperactive type    3. Herpes simplex virus (HSV) infection    4. Serum total bilirubin elevated        PLAN:     1. Annual physical exam  - Discussed age and gender appropriate screenings at this visit.  Continue healthy diet, physical exercise.  Counseled on medically appropriate vaccines based on age and current health condition.  Screening test reviewed and discussed with patient.     2. Attention deficit hyperactivity disorder (ADHD), predominantly hyperactive type  - Controlled on current regimen. Continue.   - MyChart E-visit in 3 months for medication management.   - lisdexamfetamine (VYVANSE) 20 MG capsule; Take 1 capsule (20 mg total) by mouth every morning.  Dispense: 30 capsule; Refill: 0  - lisdexamfetamine (VYVANSE) 20 MG capsule; Take 1 capsule (20 mg total) by mouth every morning.  Dispense: 30 capsule;  Refill: 0  - lisdexamfetamine (VYVANSE) 20 MG capsule; Take 1 capsule (20 mg total) by mouth every morning.  Dispense: 30 capsule; Refill: 0  - MY[facility] E-VISIT    3. Herpes simplex virus (HSV) infection  - Controlled on current regimen. Continue.   - valACYclovir (VALTREX) 1000 MG tablet; Take 1 tablet (1,000 mg total) by mouth once daily.  Dispense: 30 tablet; Refill: 1    4. Serum total bilirubin elevated  - Noted on prior CMPs. Suspect Gilbert Disease.   - Comprehensive Metabolic Panel; Future  - Bilirubin, Total and Direct; Future        RTC in 6 months for ADHD medication management.        Naeem Brown MD  Family Medicine [facility]  06/26/2025          Follow up in about 6 months (around 12/26/2025) for Check up - ADHD medication management.                       [1]   Current Outpatient Medications   Medication Sig Dispense Refill    lisdexamfetamine (VYVANSE) 20 MG capsule Take 1 capsule (20 mg total) by mouth every morning. 30 capsule 0    [START ON 6/27/2025] lisdexamfetamine (VYVANSE) 20 MG capsule Take 1 capsule (20 mg total) by mouth every morning. 30 capsule 0    [START ON 7/27/2025] lisdexamfetamine (VYVANSE) 20 MG capsule Take 1 capsule (20 mg total) by mouth every morning. 30 capsule 0    lisdexamfetamine (VYVANSE) 20 MG capsule Take 1 capsule (20 mg total) by mouth every morning. 30 capsule 0    [START ON 7/26/2025] lisdexamfetamine (VYVANSE) 20 MG capsule Take 1 capsule (20 mg total) by mouth every morning. 30 capsule 0    [START ON 8/25/2025] lisdexamfetamine (VYVANSE) 20 MG capsule Take 1 capsule (20 mg total) by mouth every morning. 30 capsule 0    valACYclovir (VALTREX) 1000 MG tablet Take 1 tablet (1,000 mg total) by mouth once daily. 30 tablet 1     No current facility-administered medications for this visit.

## 2025-07-11 DIAGNOSIS — F90.1 ATTENTION DEFICIT HYPERACTIVITY DISORDER (ADHD), PREDOMINANTLY HYPERACTIVE TYPE: ICD-10-CM

## 2025-07-11 RX ORDER — LISDEXAMFETAMINE DIMESYLATE 20 MG/1
20 CAPSULE ORAL EVERY MORNING
Qty: 30 CAPSULE | Refills: 0 | Status: CANCELLED | OUTPATIENT
Start: 2025-07-11 | End: 2025-08-10

## 2025-07-11 NOTE — TELEPHONE ENCOUNTER
Care Due:                  Date            Visit Type   Department     Provider  --------------------------------------------------------------------------------                                EP -                              PRIMARY      [facility] INTERNAL  Last Visit: 06-      CARE ([facility])   MEDICINE       Naeem BENZ Kevin                              EP -                              PRIMARY      [facility] INTERNAL  Next Visit: 12-      CARE ([facility])   MEDICINE       Naeem BENZ Kevin                                                            Last  Test          Frequency    Reason                     Performed    Due Date  --------------------------------------------------------------------------------    CBC.........  12 months..  valACYclovir.............  Not Found    Overdue    [employer] Embedded Care Due Messages. Reference number: [id number].   7/11/2025 8:51:27 AM CDT

## 2025-07-14 ENCOUNTER — E-VISIT (OUTPATIENT)
Dept: INTERNAL MEDICINE | Facility: CLINIC | Age: 40
End: 2025-07-14
Payer: COMMERCIAL

## 2025-07-14 DIAGNOSIS — F90.1 ATTENTION DEFICIT HYPERACTIVITY DISORDER (ADHD), PREDOMINANTLY HYPERACTIVE TYPE: ICD-10-CM

## 2025-07-14 DIAGNOSIS — F90.1 ATTENTION DEFICIT HYPERACTIVITY DISORDER (ADHD), PREDOMINANTLY HYPERACTIVE TYPE: Primary | ICD-10-CM

## 2025-07-14 RX ORDER — LISDEXAMFETAMINE DIMESYLATE 20 MG/1
20 CAPSULE ORAL EVERY MORNING
Qty: 30 CAPSULE | Refills: 0 | Status: CANCELLED | OUTPATIENT
Start: 2025-07-11 | End: 2025-08-10

## 2025-07-15 RX ORDER — LISDEXAMFETAMINE DIMESYLATE 20 MG/1
20 CAPSULE ORAL EVERY MORNING
Qty: 60 CAPSULE | Refills: 0 | Status: SHIPPED | OUTPATIENT
Start: 2025-07-15

## 2025-07-15 NOTE — PROGRESS NOTES
Patient ID: Moiz Galarza is a 40 y.o. male.        E-Visit Time Trackin minutes            Chief Complaint: General Illness (Entered automatically based on patient selection in "[employer].)      The patient initiated a request through "[employer] on 2025 for evaluation and management with a chief complaint of General Illness (Entered automatically based on patient selection in "[employer].)     I evaluated the questionnaire submission on 07/15/2025.    [facility]    2025  8:09 AM CDT - Filed by Patient   What do you need help with? Medication Request   Do you agree to participate in an E-Visit? Yes   If you have any of the following symptoms, please present to your local emergency room or call 911:  I acknowledge   Medication requests for narcotics will not be addressed via an E-Visit.  Please schedule an appointment. I acknowledge   Do you want to address a new or existing medication? (Start a new medication, Address a current medication) Address a current medication   What is the main issue you would like addressed today? I cant refill my vyvanse prescription until the end of the month, however, I am going out of town for work on the  and wont return until the end of Aug. Is it loasible to get cleared to refill a few days early or to get a longer supply?   Would you like to change or continue your medication? (Change medication, Continue medication) Continue medication   What is the name of the medication you would like to continue? Vyvanse   Are you taking your medication as prescribed? Yes   Which option below best describes the reason for your request? (Renew refills, Prior authorization is required) Renew refills    What medical condition is the  medication intended to treat? ADD   Has the medication helped your condition? (Yes, No, Not sure) Yes   Have you experienced any side effects from the medication? No   Provide any information you feel is important to your  history not asked above    Please attach any relevant images or files    Are you able to take your vitals? Yes         Encounter Diagnosis   Name Primary?    Attention deficit hyperactivity disorder (ADHD), predominantly hyperactive type Yes        No orders of the defined types were placed in this encounter.     Medications Ordered This Encounter   Medications    lisdexamfetamine (VYVANSE) 20 MG capsule     Sig: Take 1 capsule (20 mg total) by mouth every morning.     Dispense:  60 capsule     Refill:  0     Patient will be out of town for work starting on 7/20/25 until the end of August. Please allow for early refill if possible. Thanks.        No follow-ups on file.